# Patient Record
Sex: FEMALE | Race: WHITE | URBAN - METROPOLITAN AREA
[De-identification: names, ages, dates, MRNs, and addresses within clinical notes are randomized per-mention and may not be internally consistent; named-entity substitution may affect disease eponyms.]

---

## 2017-05-11 ENCOUNTER — TELEPHONE (OUTPATIENT)
Dept: NEUROLOGY | Age: 52
End: 2017-05-11

## 2017-05-11 RX ORDER — OXCARBAZEPINE 150 MG/1
TABLET, FILM COATED ORAL
Qty: 180 TAB | Refills: 1 | Status: SHIPPED | OUTPATIENT
Start: 2017-05-11 | End: 2017-07-18 | Stop reason: SDUPTHER

## 2017-05-11 RX ORDER — GABAPENTIN 300 MG/1
CAPSULE ORAL
Qty: 180 CAP | Refills: 1 | Status: SHIPPED | OUTPATIENT
Start: 2017-05-11 | End: 2017-07-18 | Stop reason: SDUPTHER

## 2017-05-11 NOTE — TELEPHONE ENCOUNTER
----- Message from Nile Foil sent at 5/11/2017  9:37 AM EDT -----  Regarding: /Refill  Pt called to set an appt for medication refill. Pt has an scheduled appt on Tuesday July 18, 2017 at 8:40am. Pt stated she will run out of medication before appt time. Pt take the medication gabapentin and trileptal Rx. Pt use [facility] pharmacy phone number is [phone]. Pt best contact number is [phone] alternate is work number [phone].

## 2017-07-18 ENCOUNTER — OFFICE VISIT (OUTPATIENT)
Dept: NEUROLOGY | Age: 52
End: 2017-07-18

## 2017-07-18 VITALS
RESPIRATION RATE: 18 BRPM | TEMPERATURE: 98.5 F | BODY MASS INDEX: 20.8 KG/M2 | OXYGEN SATURATION: 96 % | WEIGHT: 117.4 LBS | DIASTOLIC BLOOD PRESSURE: 60 MMHG | HEART RATE: 68 BPM | HEIGHT: 63 IN | SYSTOLIC BLOOD PRESSURE: 100 MMHG

## 2017-07-18 DIAGNOSIS — G50.0 TRIGEMINAL NEURALGIA OF LEFT SIDE OF FACE: Primary | ICD-10-CM

## 2017-07-18 RX ORDER — OXCARBAZEPINE 150 MG/1
TABLET, FILM COATED ORAL
Qty: 180 TAB | Refills: 11 | Status: SHIPPED | OUTPATIENT
Start: 2017-07-18 | End: 2018-11-23 | Stop reason: DRUGHIGH

## 2017-07-18 RX ORDER — GABAPENTIN 300 MG/1
CAPSULE ORAL
Qty: 180 CAP | Refills: 11 | Status: SHIPPED | OUTPATIENT
Start: 2017-07-18 | End: 2018-12-12 | Stop reason: DRUGHIGH

## 2017-07-18 NOTE — PATIENT INSTRUCTIONS
[address] [facility]   Statement to Patients  April 1, 2014      In an effort to ensure the large volume of patient prescription refills is processed in the most efficient and expeditious manner, we are asking our patients to assist us by calling your Pharmacy for all prescription refills, this will include also your  Mail Order Pharmacy. The pharmacy will contact our office electronically to continue the refill process. Please do not wait until the last minute to call your pharmacy. We need at least 48 hours (2days) to fill prescriptions. We also encourage you to call your pharmacy before going to  your prescription to make sure it is ready. With regard to controlled substance prescription refill requests (narcotic refills) that need to be picked up at our office, we ask your cooperation by providing us with at least 72 hours (3days) notice that you will need a refill. We will not refill narcotic prescription refill requests after 4:00pm on any weekday, Monday through Thursday, or after 2:00pm on Fridays, or on the weekends. We encourage everyone to explore another way of getting your prescription refill request processed using [email or web address], our patient web portal through our electronic medical record system. [email or web address] is an efficient and effective way to communicate your medication request directly to the office and  downloadable as an tomasa on your smart phone . [email or web address] also features a review functionality that allows you to view your medication list as well as leave messages for your physician. Are you ready to get connected? If so please review the attatched instructions or speak to any of our staff to get you set up right away! Thank you so much for your cooperation. Should you have any questions please contact our Practice Administrator.     The Physicians and Staff,  [facility]              A Healthy Lifestyle: Care Instructions  Your Care Instructions  A healthy lifestyle can help you feel good, stay at a healthy weight, and have plenty of energy for both work and play. A healthy lifestyle is something you can share with your whole family. A healthy lifestyle also can lower your risk for serious health problems, such as high blood pressure, heart disease, and diabetes. You can follow a few steps listed below to improve your health and the health of your family. Follow-up care is a key part of your treatment and safety. Be sure to make and go to all appointments, and call your doctor if you are having problems. Its also a good idea to know your test results and keep a list of the medicines you take. How can you care for yourself at home? · Do not eat too much sugar, fat, or fast foods. You can still have dessert and treats now and then. The goal is moderation. · Start small to improve your eating habits. Pay attention to portion sizes, drink less juice and soda pop, and eat more fruits and vegetables. ¨ Eat a healthy amount of food. A 3-ounce serving of meat, for example, is about the size of a deck of cards. Fill the rest of your plate with vegetables and whole grains. ¨ Limit the amount of soda and sports drinks you have every day. Drink more water when you are thirsty. ¨ Eat at least 5 servings of fruits and vegetables every day. It may seem like a lot, but it is not hard to reach this goal. A serving or helping is 1 piece of fruit, 1 cup of vegetables, or 2 cups of leafy, raw vegetables. Have an apple or some carrot sticks as an afternoon snack instead of a candy bar. Try to have fruits and/or vegetables at every meal.  · Make exercise part of your daily routine. You may want to start with simple activities, such as walking, bicycling, or slow swimming. Try to be active 30 to 60 minutes every day. You do not need to do all 30 to 60 minutes all at once. For example, you can exercise 3 times a day for 10 or 20 minutes.  Moderate exercise is safe for most people, but it is always a good idea to talk to your doctor before starting an exercise program.  · Keep moving. Renata Velezer the lawn, work in the garden, or [employer]. Take the stairs instead of the elevator at work. · If you smoke, quit. People who smoke have an increased risk for heart attack, stroke, cancer, and other lung illnesses. Quitting is hard, but there are ways to boost your chance of quitting tobacco for good. ¨ Use nicotine gum, patches, or lozenges. ¨ Ask your doctor about stop-smoking programs and medicines. ¨ Keep trying. In addition to reducing your risk of diseases in the future, you will notice some benefits soon after you stop using tobacco. If you have shortness of breath or asthma symptoms, they will likely get better within a few weeks after you quit. · Limit how much alcohol you drink. Moderate amounts of alcohol (up to 2 drinks a day for men, 1 drink a day for women) are okay. But drinking too much can lead to liver problems, high blood pressure, and other health problems. Family health  If you have a family, there are many things you can do together to improve your health. · Eat meals together as a family as often as possible. · Eat healthy foods. This includes fruits, vegetables, lean meats and dairy, and whole grains. · Include your family in your fitness plan. Most people think of activities such as jogging or tennis as the way to fitness, but there are many ways you and your family can be more active. Anything that makes you breathe hard and gets your heart pumping is exercise. Here are some tips:  ¨ Walk to do errands or to take your child to school or the bus. ¨ Go for a family bike ride after dinner instead of watching TV. Where can you learn more? Go to http://stuart-sae.info/. Enter X544 in the search box to learn more about \"A Healthy Lifestyle: Care Instructions. \"  Current as of: July 26, 2016  Content Version: 11.3  © 4401-4002 Healthwise, Incorporated. Care instructions adapted under license by [employer] (which disclaims liability or warranty for this information). If you have questions about a medical condition or this instruction, always ask your healthcare professional. [address] any warranty or liability for your use of this information. Patient doing well with pain control on current Trileptal gabapentin regimen. Check updated labs and revisit in 1 year.

## 2017-07-18 NOTE — PROGRESS NOTES
Neurology Consult      Subjective:      Tracy Pinto is a 45 y.o. female who returns with long-standing left trigeminal neuralgia form pain. Has been routinely happy with pain control Trileptal 150 mg taking to 3 times a day and gabapentin 300 mg taking 2 3 times a day. Sometimes alters her schedule to fit her pain frequency and this intake can change according to her needs. Does not mention any new medical or surgical issues. Last week had some brief pain breakdown but blamed it on her new job or change of medication schedule. Today's exam looks normal.  Had not been seen since May 2015. Needs updated labs. Feels comfortable with a revisit in 1 year as she has been followed since the 1990s as I recall from my old practice. If there is any change or difficulties in her situation she would need to call, and if I do not hear from her I will assume she is doing well. Current Outpatient Prescriptions   Medication Sig Dispense Refill    OXcarbazepine (TRILEPTAL) 150 mg tablet TAKE 2 TABLETS 3 TIMES A  Tab 11    gabapentin (NEURONTIN) 300 mg capsule TAKE 1 CAPSULE BY MOUTH SIX (6) TIMES DAILY. 180 Cap 11      Allergies   Allergen Reactions    Penicillins Hives     Past Medical History:   Diagnosis Date    Trigeminal neuralgia 11/28/2012      No past surgical history on file. Social History     Social History    Marital status:      Spouse name: N/A    Number of children: N/A    Years of education: N/A     Occupational History    Not on file.      Social History Main Topics    Smoking status: Never Smoker    Smokeless tobacco: Not on file    Alcohol use 2.0 oz/week     4 Cans of beer per week    Drug use: No    Sexual activity: Not on file     Other Topics Concern    Not on file     Social History Narrative      Family History   Problem Relation Age of Onset    Stroke Mother     Cancer Sister       Visit Vitals    /60    Pulse 68    Temp 98.5 °F (36.9 °C) (Oral)  Resp 18    Ht 5' 3\" (1.6 m)    Wt 53.3 kg (117 lb 6.4 oz)    SpO2 96%    BMI 20.8 kg/m2        Review of Systems:   A comprehensive review of systems was negative except for that written in the HPI. Neuro Exam:     Appearance: The patient is well developed, well nourished, provides a coherent history and is in no acute distress. Mental Status: Oriented to time, place and person. Mood and affect appropriate. Cranial Nerves:   Intact visual fields. Fundi are benign. BELLE, EOM's full, no nystagmus, no ptosis. Facial sensation is normal. Corneal reflexes are intact. Facial movement is symmetric. Hearing is normal bilaterally. Palate is midline with normal sternocleidomastoid and trapezius muscles are normal. Tongue is midline. Motor:  5/5 strength in upper and lower proximal and distal muscles. Normal bulk and tone. No fasciculations. Reflexes:   Deep tendon reflexes 2+/4 and symmetrical.   Sensory:   Normal to touch, pinprick and vibration. Gait:  Normal gait. Tremor:   No tremor noted. Cerebellar:  No cerebellar signs present. Neurovascular:  Normal heart sounds and regular rhythm, peripheral pulses intact, and no carotid bruits. Assessment:   Chronic left trigeminal neuralgic pain. Happy with pain control and will continue the Trileptal 150 mg taking 2 3 times a day and gabapentin 300 mg taking 2 3 times a day. No new issues uncovered exam is normal.  Check updated labs and revisit one year. Plan:   Revisit one year.   Signed by :  Chen Britton MD

## 2017-07-18 NOTE — MR AVS SNAPSHOT
Visit Information Date & Time Provider Department Dept. Phone Encounter #  
 7/18/2017  8:40 AM Bertha Shanks MD [facility] [phone] [id number] Follow-up Instructions Return in about 1 year (around 7/18/2018). Upcoming Health Maintenance Date Due Hepatitis C Screening 1965 DTaP/Tdap/Td series (1 - Tdap) 6/7/1984 PAP AKA CERVICAL CYTOLOGY 6/7/1984 BREAST CANCER SCRN MAMMOGRAM 6/7/2013 FOBT Q 1 YEAR AGE 50-75 6/7/2013 INFLUENZA AGE 9 TO ADULT 8/1/2017 Allergies as of 7/18/2017  Review Complete On: 7/18/2017 By: Bertha Shanks MD  
  
 Severity Noted Reaction Type Reactions Penicillins  11/28/2012    Hives Current Immunizations  Never Reviewed No immunizations on file. Not reviewed this visit You Were Diagnosed With   
  
 Codes Comments Trigeminal neuralgia of left side of face    -  Primary ICD-10-CM: G50.0 ICD-9-CM: 350.1 Vitals BP Pulse Temp Resp Height(growth percentile) Weight(growth percentile) 100/60 68 98.5 °F (36.9 °C) (Oral) 18 5' 3\" (1.6 m) 117 lb 6.4 oz (53.3 kg) SpO2 BMI OB Status Smoking Status 96% 20.8 kg/m2 Postmenopausal Never Smoker Vitals History BMI and BSA Data Body Mass Index Body Surface Area  
 20.8 kg/m 2 1.54 m 2 Preferred Pharmacy Pharmacy Name Phone [facility]/PHARMACY #[id number] - [place], [place] - [address] [phone] Your Updated Medication List  
  
   
This list is accurate as of: 7/18/17  9:03 AM.  Always use your most recent med list.  
  
  
  
  
 gabapentin 300 mg capsule Commonly known as:  NEURONTIN  
TAKE 1 CAPSULE BY MOUTH SIX (6) TIMES DAILY. OXcarbazepine 150 mg tablet Commonly known as:  TRILEPTAL  
TAKE 2 TABLETS 3 TIMES A DAY Prescriptions Sent to Pharmacy Refills  OXcarbazepine (TRILEPTAL) 150 mg tablet 11  
 Sig: TAKE 2 TABLETS 3 TIMES A DAY Class: Normal  
 Pharmacy: [facility]/pharmacy #[id number] [place], [place] - [address] Ph #: [phone]  
 gabapentin (NEURONTIN) 300 mg capsule 11 Sig: TAKE 1 CAPSULE BY MOUTH SIX (6) TIMES DAILY. Class: Normal  
 Pharmacy: [facility]/pharmacy #[id number] - [place], [place] - [address] Ph #: [phone] We Performed the Following CBC WITH AUTOMATED DIFF [68645 CPT(R)] HEPATIC FUNCTION PANEL [41297 CPT(R)] METABOLIC PANEL, BASIC [15755 CPT(R)] OXCARBAZEPINE(TRILEPTAL) [id number] CPT(R)] Follow-up Instructions Return in about 1 year (around 7/18/2018). Patient Instructions PRESCRIPTION REFILL POLICY [facility] Statement to Patients April 1, 2014 In an effort to ensure the large volume of patient prescription refills is processed in the most efficient and expeditious manner, we are asking our patients to assist us by calling your Pharmacy for all prescription refills, this will include also your  Mail Order Pharmacy. The pharmacy will contact our office electronically to continue the refill process. Please do not wait until the last minute to call your pharmacy. We need at least 48 hours (2days) to fill prescriptions. We also encourage you to call your pharmacy before going to  your prescription to make sure it is ready. With regard to controlled substance prescription refill requests (narcotic refills) that need to be picked up at our office, we ask your cooperation by providing us with at least 72 hours (3days) notice that you will need a refill. We will not refill narcotic prescription refill requests after 4:00pm on any weekday, Monday through Thursday, or after 2:00pm on Fridays, or on the weekends.   
  
We encourage everyone to explore another way of getting your prescription refill request processed using [employer], our patient web portal through our electronic medical record system. Nur[employer] is an efficient and effective way to communicate your medication request directly to the office and  downloadable as an tomasa on your smart phone . [employer] also features a review functionality that allows you to view your medication list as well as leave messages for your physician. Are you ready to get connected? If so please review the attatched instructions or speak to any of our staff to get you set up right away! Thank you so much for your cooperation. Should you have any questions please contact our Practice Administrator. The Physicians and Staff,  [facility] A Healthy Lifestyle: Care Instructions Your Care Instructions A healthy lifestyle can help you feel good, stay at a healthy weight, and have plenty of energy for both work and play. A healthy lifestyle is something you can share with your whole family. A healthy lifestyle also can lower your risk for serious health problems, such as high blood pressure, heart disease, and diabetes. You can follow a few steps listed below to improve your health and the health of your family. Follow-up care is a key part of your treatment and safety. Be sure to make and go to all appointments, and call your doctor if you are having problems. Its also a good idea to know your test results and keep a list of the medicines you take. How can you care for yourself at home? · Do not eat too much sugar, fat, or fast foods. You can still have dessert and treats now and then. The goal is moderation. · Start small to improve your eating habits. Pay attention to portion sizes, drink less juice and soda pop, and eat more fruits and vegetables. ¨ Eat a healthy amount of food. A 3-ounce serving of meat, for example, is about the size of a deck of cards. Fill the rest of your plate with vegetables and whole grains. ¨ Limit the amount of soda and sports drinks you have every day.  Drink more water when you are thirsty. ¨ Eat at least 5 servings of fruits and vegetables every day. It may seem like a lot, but it is not hard to reach this goal. A serving or helping is 1 piece of fruit, 1 cup of vegetables, or 2 cups of leafy, raw vegetables. Have an apple or some carrot sticks as an afternoon snack instead of a candy bar. Try to have fruits and/or vegetables at every meal. 
· Make exercise part of your daily routine. You may want to start with simple activities, such as walking, bicycling, or slow swimming. Try to be active 30 to 60 minutes every day. You do not need to do all 30 to 60 minutes all at once. For example, you can exercise 3 times a day for 10 or 20 minutes. Moderate exercise is safe for most people, but it is always a good idea to talk to your doctor before starting an exercise program. 
· Keep moving. Janet Heriquincy the lawn, work in the garden, or [employer]. Take the stairs instead of the elevator at work. · If you smoke, quit. People who smoke have an increased risk for heart attack, stroke, cancer, and other lung illnesses. Quitting is hard, but there are ways to boost your chance of quitting tobacco for good. ¨ Use nicotine gum, patches, or lozenges. ¨ Ask your doctor about stop-smoking programs and medicines. ¨ Keep trying. In addition to reducing your risk of diseases in the future, you will notice some benefits soon after you stop using tobacco. If you have shortness of breath or asthma symptoms, they will likely get better within a few weeks after you quit. · Limit how much alcohol you drink. Moderate amounts of alcohol (up to 2 drinks a day for men, 1 drink a day for women) are okay. But drinking too much can lead to liver problems, high blood pressure, and other health problems. Family health If you have a family, there are many things you can do together to improve your health. · Eat meals together as a family as often as possible. · Eat healthy foods. This includes fruits, vegetables, lean meats and dairy, and whole grains. · Include your family in your fitness plan. Most people think of activities such as jogging or tennis as the way to fitness, but there are many ways you and your family can be more active. Anything that makes you breathe hard and gets your heart pumping is exercise. Here are some tips: 
¨ Walk to do errands or to take your child to school or the bus. ¨ Go for a family bike ride after dinner instead of watching TV. Where can you learn more? Go to http://stuartGigitsae.info/. Enter B780 in the search box to learn more about \"A Healthy Lifestyle: Care Instructions. \" Current as of: July 26, 2016 Content Version: 11.3 © 7325-0195 [employer]. Care instructions adapted under license by [employer] (which disclaims liability or warranty for this information). If you have questions about a medical condition or this instruction, always ask your healthcare professional. [place] [address] any warranty or liability for your use of this information. Patient doing well with pain control on current Trileptal gabapentin regimen. Check updated labs and revisit in 1 year. Introducing [facility]! Lima Salcido introduces [employer] patient portal. Now you can access parts of your medical record, email your doctor's office, and request medication refills online. 1. In your internet browser, go to [email or web address] 2. Click on the First Time User? Click Here link in the Sign In box. You will see the New Member Sign Up page. 3. Enter your [employer] Access Code exactly as it appears below. You will not need to use this code after youve completed the sign-up process. If you do not sign up before the expiration date, you must request a new code. · [employer] Access Code: [email or web address] Expires: 10/16/2017  8:26 AM 
 
 4. Enter the last four digits of your Social Security Number (xxxx) and Date of Birth (mm/dd/yyyy) as indicated and click Submit. You will be taken to the next sign-up page. 5. Create a Viewster ID. This will be your Viewster login ID and cannot be changed, so think of one that is secure and easy to remember. 6. Create a Viewster password. You can change your password at any time. 7. Enter your Password Reset Question and Answer. This can be used at a later time if you forget your password. 8. Enter your e-mail address. You will receive e-mail notification when new information is available in [address] 9. Click Sign Up. You can now view and download portions of your medical record. 10. Click the Download Summary menu link to download a portable copy of your medical information. If you have questions, please visit the Frequently Asked Questions section of the View[employer] website. Remember, View[employer] is NOT to be used for urgent needs. For medical emergencies, dial 911. Now available from your iPhone and Android! Please provide this summary of care documentation to your next provider. Your primary care clinician is listed as Edwin Goshen. If you have any questions after today's visit, please call [phone].

## 2018-02-06 ENCOUNTER — OFFICE VISIT (OUTPATIENT)
Dept: NEUROLOGY | Age: 53
End: 2018-02-06

## 2018-02-06 VITALS
TEMPERATURE: 98.2 F | HEART RATE: 86 BPM | RESPIRATION RATE: 18 BRPM | DIASTOLIC BLOOD PRESSURE: 60 MMHG | HEIGHT: 63 IN | OXYGEN SATURATION: 98 % | WEIGHT: 121.9 LBS | SYSTOLIC BLOOD PRESSURE: 112 MMHG | BODY MASS INDEX: 21.6 KG/M2

## 2018-02-06 DIAGNOSIS — F41.9 ANXIETY: ICD-10-CM

## 2018-02-06 DIAGNOSIS — G50.0 TRIGEMINAL NEURALGIA OF LEFT SIDE OF FACE: Primary | ICD-10-CM

## 2018-02-06 RX ORDER — LORAZEPAM 0.5 MG/1
TABLET ORAL
Qty: 12 TAB | Refills: 0 | Status: SHIPPED | OUTPATIENT
Start: 2018-02-06 | End: 2020-03-25 | Stop reason: SDUPTHER

## 2018-02-06 NOTE — PROGRESS NOTES
Neurology Consult      Subjective:      Julien Roca is a 45 y.o. female who comes in with long-established left V2 distribution trigeminal neuralgia. I believe her normal control schedule is Trileptal 150 mg taking the equivalent of 2 3 times a day or 6 total in some fashion. On the gabapentin it is 300 mg and again up to 6 total per day on average. For some reason the pain resurfaced while she was driving her car. Was very intense and she became very anxious because she feared the pain would escalate and would be uncontrollable. By degrees started to add the equivalent of an extra Trileptal and gabapentin to her daily regimen. The difficulty was she started getting dizziness in recent days and that caused her to be anxious as well. Is very uptight and stressed and this probably goes along with steady-state stress on the job where she is assigned a new job location which is not convenient or conducive to her residence. Interestingly, she is noticed no sensory changes in her mouth or tooth sensitivity etc. no recent history of trauma rash chills fever or similar sort of triggers. On today's exam she is completely normal and recently described the pain characteristic as electrical and was able to find by discovery the touching certain parts of her face that she has the nose was a prime instigator. Current Outpatient Prescriptions   Medication Sig Dispense Refill    LORazepam (ATIVAN) 0.5 mg tablet 1/2 po every day prn only. .. Indications: anxiety 12 Tab 0    OXcarbazepine (TRILEPTAL) 150 mg tablet TAKE 2 TABLETS 3 TIMES A  Tab 11    gabapentin (NEURONTIN) 300 mg capsule TAKE 1 CAPSULE BY MOUTH SIX (6) TIMES DAILY. 180 Cap 11      Allergies   Allergen Reactions    Penicillins Hives     Past Medical History:   Diagnosis Date    Trigeminal neuralgia 11/28/2012      No past surgical history on file.    Social History     Social History    Marital status:      Spouse name: N/A  Number of children: N/A    Years of education: N/A     Occupational History    Not on file. Social History Main Topics    Smoking status: Never Smoker    Smokeless tobacco: Never Used    Alcohol use 2.0 oz/week     4 Cans of beer per week    Drug use: No    Sexual activity: Not on file     Other Topics Concern    Not on file     Social History Narrative      Family History   Problem Relation Age of Onset    Stroke Mother     Cancer Sister       Visit Vitals    /60    Pulse 86    Temp 98.2 °F (36.8 °C) (Oral)    Resp 18    Ht 5' 3\" (1.6 m)    Wt 55.3 kg (121 lb 14.4 oz)    SpO2 98%    BMI 21.59 kg/m2        Review of Systems:   A comprehensive review of systems was negative except for that written in the HPI. Neuro Exam:     Appearance: The patient is well developed, well nourished, provides a coherent history and is in no acute distress. Mental Status: Oriented to time, place and person. Mood and affect appropriate. Cranial Nerves:   Intact visual fields. Fundi are benign. BELLE, EOM's full, no nystagmus, no ptosis. Facial sensation is normal. Corneal reflexes are intact. Facial movement is symmetric. Hearing is normal bilaterally. Palate is midline with normal sternocleidomastoid and trapezius muscles are normal. Tongue is midline. Motor:  5/5 strength in upper and lower proximal and distal muscles. Normal bulk and tone. No fasciculations. Reflexes:   Deep tendon reflexes 2+/4 and symmetrical.   Sensory:   Normal to touch, pinprick and vibration. Gait:  Normal gait. Tremor:   No tremor noted. Cerebellar:  No cerebellar signs present. Neurovascular:  Normal heart sounds and regular rhythm, peripheral pulses intact, and no carotid bruits. Assessment:   Left V2 trigeminal neuralgia. Recent exacerbation and has increased her intake of the gabapentin and Trileptal accordingly.   Will need some positive identification to what her baseline dosing was in which she recently increased to. She will call us back tomorrow to confirm that. Says this whole experience is made her more anxious than usual and issued her 0.5 mg strength Ativan half a tablet a day as needed as needed. Was warned about sedation especially in lieu of the medicine she is already on with the gabapentin and Trileptal.  Revisit in 6 months. For future reference could consider a referral to neurosurgery which she has done I believe once before at my old practice. That was an encounter and did not lead to gamma knife surgery or anything similar. For what it is worth today's exam was normal.       Plan:   Revisit 6 months.   Signed by :  Kaylee Caal MD

## 2018-02-06 NOTE — PATIENT INSTRUCTIONS
[facility]   Statement to Patients  April 1, 2014      In an effort to ensure the large volume of patient prescription refills is processed in the most efficient and expeditious manner, we are asking our patients to assist us by calling your Pharmacy for all prescription refills, this will include also your  Mail Order Pharmacy. The pharmacy will contact our office electronically to continue the refill process. Please do not wait until the last minute to call your pharmacy. We need at least 48 hours (2days) to fill prescriptions. We also encourage you to call your pharmacy before going to  your prescription to make sure it is ready. With regard to controlled substance prescription refill requests (narcotic refills) that need to be picked up at our office, we ask your cooperation by providing us with at least 72 hours (3days) notice that you will need a refill. We will not refill narcotic prescription refill requests after 4:00pm on any weekday, Monday through Thursday, or after 2:00pm on Fridays, or on the weekends. We encourage everyone to explore another way of getting your prescription refill request processed using Viyet, our patient web portal through our electronic medical record system. Viyet is an efficient and effective way to communicate your medication request directly to the office and  downloadable as an tomasa on your smart phone . Viyet also features a review functionality that allows you to view your medication list as well as leave messages for your physician. Are you ready to get connected? If so please review the attatched instructions or speak to any of our staff to get you set up right away! Thank you so much for your cooperation. Should you have any questions please contact our Practice Administrator.     The Physicians and Staff,  [facility]              Trigeminal Neuralgia: Care Instructions  Your Care Instructions    Trigeminal neuralgia is a problem with the large nerve that brings feeling to your face. It causes a sudden, sharp pain on one side of your face. Just touching your cheek or talking can set off shooting pain toward the ear, eye, or nostril. Living with this pain can be very hard. Some people have long periods when they do not have pain, and then it comes back. Some people have periods of pain often. But medicine or other treatment often can make the pain go away. If you keep having pain, surgery may help. This problem is also called tic douloureux (say \"tik doo-chle-HARDY\"). Follow-up care is a key part of your treatment and safety. Be sure to make and go to all appointments, and call your doctor if you are having problems. It's also a good idea to know your test results and keep a list of the medicines you take. How can you care for yourself at home? · Write down when you have pain and what you were doing when it started. Try to find what causes the pain. Being in a cold wind, yawning, or shaving are examples. Avoid or limit these triggers if you can. · Be safe with medicines. Take your medicines exactly as prescribed. ¨ Your doctor may have prescribed medicines used to treat depression and seizures. They can reduce your pain, help you sleep better, and improve your mood. ¨ Call your doctor if you think you are having a problem with your medicine. You will get more details on the specific medicines your doctor prescribes. ¨ If you are not taking a prescription pain medicine, take an over-the-counter medicine such as acetaminophen (Tylenol), ibuprofen (Advil, Motrin), or naproxen (Aleve). Read and follow all instructions on the label. ¨ Do not take two or more pain medicines at the same time unless the doctor told you to. Many pain medicines have acetaminophen, which is Tylenol. Too much acetaminophen (Tylenol) can be harmful. · Reduce stress in your life.  Ask your doctor about ways to relax. These may include breathing exercises and massage. · Think about joining a support group with other people who have this problem. These groups can give comfort and information about what to do to feel better. Your doctor can tell you how to find a support group. When should you call for help? Call your doctor now or seek immediate medical care if:  ? · You have severe pain that you can't control. ? Watch closely for changes in your health, and be sure to contact your doctor if:  ? · You are not able to sleep because of the pain. ? · You do not get better as expected. Where can you learn more? Go to http://stuart-sae.info/. Enter R378 in the search box to learn more about \"Trigeminal Neuralgia: Care Instructions. \"  Current as of: March 20, 2017  Content Version: 11.4  © 4549-1086 [employer]. Care instructions adapted under license by [employer] (which disclaims liability or warranty for this information). If you have questions about a medical condition or this instruction, always ask your healthcare professional. [place] [address] any warranty or liability for your use of this information. Learning About Living Jason Neither  What is a living will? A living will is a legal form you use to write down the kind of care you want at the end of your life. It is used by the health professionals who will treat you if you aren't able to decide for yourself. If you put your wishes in writing, your loved ones and others will know what kind of care you want. They won't need to guess. This can ease your mind and be helpful to others. A living will is not the same as an estate or property will. An estate will explains what you want to happen with your money and property after you die. Is a living will a legal document? A living will is a legal document. Each state has its own laws about living [place].  If you move to another state, make sure that your living will is legal in the state where you now live. Or you might use a universal form that has been approved by many states. This kind of form can sometimes be completed and stored online. Your electronic copy will then be available wherever you have a connection to the Internet. In most cases, doctors will respect your wishes even if you have a form from a different state. · You don't need an  to complete a living will. But legal advice can be helpful if your state's laws are unclear, your health history is complicated, or your family can't agree on what should be in your living will. · You can change your living will at any time. Some people find that their wishes about end-of-life care change as their health changes. · In addition to making a living will, think about completing a medical power of  form. This form lets you name the person you want to make end-of-life treatment decisions for you (your \"health care agent\") if you're not able to. Many hospitals and nursing homes will give you the forms you need to complete a living will and a medical power of . · Your living will is used only if you can't make or communicate decisions for yourself anymore. If you become able to make decisions again, you can accept or refuse any treatment, no matter what you wrote in your living will. · Your state may offer an online registry. This is a place where you can store your living will online so the doctors and nurses who need to treat you can find it right away. What should you think about when creating a living will? Talk about your end-of-life wishes with your family members and your doctor. Let them know what you want. That way the people making decisions for you won't be surprised by your choices. Think about these questions as you make your living will:  · Do you know enough about life support methods that might be used?  If not, talk to your doctor so you know what might be done if you can't breathe on your own, your heart stops, or you're unable to swallow. · What things would you still want to be able to do after you receive life-support methods? Would you want to be able to walk? To speak? To eat on your own? To live without the help of machines? · If you have a choice, where do you want to be cared for? In your home? At a hospital or nursing home? · Do you want certain Spiritism practices performed if you become very ill? · If you have a choice at the end of your life, where would you prefer to die? At home? In a hospital or nursing home? Somewhere else? · Would you prefer to be buried or cremated? · Do you want your organs to be donated after you die? What should you do with your living will? · Make sure that your family members and your health care agent have copies of your living will. · Give your doctor a copy of your living will to keep in your medical record. If you have more than one doctor, make sure that each one has a copy. · You may want to put a copy of your living will where it can be easily found. Where can you learn more? Go to http://stuart-sae.info/. Enter T463 in the search box to learn more about \"Learning About Living Rob Thomason. \"  Current as of: September 24, 2016  Content Version: 11.4  © 4929-0083 [employer]. Care instructions adapted under license by [employer] (which disclaims liability or warranty for this information). If you have questions about a medical condition or this instruction, always ask your healthcare professional. [place] [address] any warranty or liability for your use of this information. Patient history reviewed and patient examined. Sounds like a recent relapse in the trigeminal neuralgia and will increase both the Trileptal and gabapentin to 7 each per day. On request will issue some very low-dose Ativan with the anxiety provoking features with the pain.   Revisit here in 6 months.

## 2018-02-06 NOTE — MR AVS SNAPSHOT
Wilmerinez Camacho 
 
 
 [place] [address] [id number] [phone] Patient: Alvaro Cuadra MRN: [id number] Visit Information Date & Time Provider Department Dept. Phone Encounter #  
 2/6/2018  3:00 PM Manish Borjas MD 3 [facility]rology [facility] [phone] [id number] Follow-up Instructions Return in about 6 months (around 8/6/2018). Your Appointments 7/24/2018  8:40 AM  
Follow Up with Manish Borjas MD  
[facility] Appt Note: neuralgia ericka  
 [address] [address] [phone]  
  
   
 [place] [address] [id number] [address] Upcoming Health Maintenance Date Due Hepatitis C Screening 1965 DTaP/Tdap/Td series (1 - Tdap) 6/7/1984 PAP AKA CERVICAL CYTOLOGY 6/7/1984 BREAST CANCER SCRN MAMMOGRAM 6/7/2013 FOBT Q 1 YEAR AGE 50-75 6/7/2013 Influenza Age 5 to Adult 8/1/2017 Allergies as of 2/6/2018  Review Complete On: 2/6/2018 By: Manish Borjas MD  
  
 Severity Noted Reaction Type Reactions Penicillins  11/28/2012    Hives Current Immunizations  Never Reviewed No immunizations on file. Not reviewed this visit You Were Diagnosed With   
  
 Codes Comments Trigeminal neuralgia of left side of face    -  Primary ICD-10-CM: G50.0 ICD-9-CM: 350.1 Anxiety     ICD-10-CM: F41.9 ICD-9-CM: 300.00 Vitals BP Pulse Temp Resp Height(growth percentile) Weight(growth percentile) 112/60 86 98.2 °F (36.8 °C) (Oral) 18 5' 3\" (1.6 m) 121 lb 14.4 oz (55.3 kg) SpO2 BMI OB Status Smoking Status 98% 21.59 kg/m2 Postmenopausal Never Smoker Vitals History BMI and BSA Data Body Mass Index Body Surface Area  
 21.59 kg/m 2 1.57 m 2 Preferred Pharmacy Pharmacy Name Phone [id number] - [place], [place] - [address] [address] [phone] Your Updated Medication List  
  
   
This list is accurate as of: 18  3:49 PM.  Always use your most recent med list.  
  
  
  
  
 gabapentin 300 mg capsule Commonly known as:  NEURONTIN  
TAKE 1 CAPSULE BY MOUTH SIX (6) TIMES DAILY. LORazepam 0.5 mg tablet Commonly known as:  ATIVAN  
1/2 po every day prn only. .. Indications: anxiety OXcarbazepine 150 mg tablet Commonly known as:  TRILEPTAL  
TAKE 2 TABLETS 3 TIMES A DAY Prescriptions Printed Refills LORazepam (ATIVAN) 0.5 mg tablet 0 Si/2 po every day prn only. .. Indications: anxiety Class: Print Follow-up Instructions Return in about 6 months (around 2018). Patient Instructions PRESCRIPTION REFILL POLICY [facility] Statement to Patients 2014 In an effort to ensure the large volume of patient prescription refills is processed in the most efficient and expeditious manner, we are asking our patients to assist us by calling your Pharmacy for all prescription refills, this will include also your  Mail Order Pharmacy. The pharmacy will contact our office electronically to continue the refill process. Please do not wait until the last minute to call your pharmacy. We need at least 48 hours (2days) to fill prescriptions. We also encourage you to call your pharmacy before going to  your prescription to make sure it is ready. With regard to controlled substance prescription refill requests (narcotic refills) that need to be picked up at our office, we ask your cooperation by providing us with at least 72 hours (3days) notice that you will need a refill. We will not refill narcotic prescription refill requests after 4:00pm on any weekday, Monday through Thursday, or after 2:00pm on , or on the weekends. We encourage everyone to explore another way of getting your prescription refill request processed using [employer], our patient web portal through our electronic medical record system. [employer] is an efficient and effective way to communicate your medication request directly to the office and  downloadable as an tomasa on your smart phone . [employer] also features a review functionality that allows you to view your medication list as well as leave messages for your physician. Are you ready to get connected? If so please review the attatched instructions or speak to any of our staff to get you set up right away! Thank you so much for your cooperation. Should you have any questions please contact our Practice Administrator. The Physicians and Staff,  [facility] Trigeminal Neuralgia: Care Instructions Your Care Instructions Trigeminal neuralgia is a problem with the large nerve that brings feeling to your face. It causes a sudden, sharp pain on one side of your face. Just touching your cheek or talking can set off shooting pain toward the ear, eye, or nostril. Living with this pain can be very hard. Some people have long periods when they do not have pain, and then it comes back. Some people have periods of pain often. But medicine or other treatment often can make the pain go away. If you keep having pain, surgery may help. This problem is also called tic douloureux (say \"chrissiek doo-chel-HARDY\"). Follow-up care is a key part of your treatment and safety. Be sure to make and go to all appointments, and call your doctor if you are having problems. It's also a good idea to know your test results and keep a list of the medicines you take. How can you care for yourself at home? · Write down when you have pain and what you were doing when it started. Try to find what causes the pain. Being in a cold wind, yawning, or shaving are examples. Avoid or limit these triggers if you can. · Be safe with medicines. Take your medicines exactly as prescribed. ¨ Your doctor may have prescribed medicines used to treat depression and seizures. They can reduce your pain, help you sleep better, and improve your mood. ¨ Call your doctor if you think you are having a problem with your medicine. You will get more details on the specific medicines your doctor prescribes. ¨ If you are not taking a prescription pain medicine, take an over-the-counter medicine such as acetaminophen (Tylenol), ibuprofen (Advil, Motrin), or naproxen (Aleve). Read and follow all instructions on the label. ¨ Do not take two or more pain medicines at the same time unless the doctor told you to. Many pain medicines have acetaminophen, which is Tylenol. Too much acetaminophen (Tylenol) can be harmful. · Reduce stress in your life. Ask your doctor about ways to relax. These may include breathing exercises and massage. · Think about joining a support group with other people who have this problem. These groups can give comfort and information about what to do to feel better. Your doctor can tell you how to find a support group. When should you call for help? Call your doctor now or seek immediate medical care if: 
? · You have severe pain that you can't control. ? Watch closely for changes in your health, and be sure to contact your doctor if: 
? · You are not able to sleep because of the pain. ? · You do not get better as expected. Where can you learn more? Go to http://stuart-sae.info/. Enter R378 in the search box to learn more about \"Trigeminal Neuralgia: Care Instructions. \" Current as of: March 20, 2017 Content Version: 11.4 © 4420-2076 Kijubi. Care instructions adapted under license by [employer] (which disclaims liability or warranty for this information).  If you have questions about a medical condition or this instruction, always ask your healthcare professional. [place] [address] any warranty or liability for your use of this information. [address] What is a living will? A living will is a legal form you use to write down the kind of care you want at the end of your life. It is used by the health professionals who will treat you if you aren't able to decide for yourself. If you put your wishes in writing, your loved ones and others will know what kind of care you want. They won't need to guess. This can ease your mind and be helpful to others. A living will is not the same as an estate or property will. An estate will explains what you want to happen with your money and property after you die. Is a living will a legal document? A living will is a legal document. Each state has its own laws about living parkinson. If you move to another state, make sure that your living will is legal in the state where you now live. Or you might use a universal form that has been approved by many states. This kind of form can sometimes be completed and stored online. Your electronic copy will then be available wherever you have a connection to the Internet. In most cases, doctors will respect your wishes even if you have a form from a different state. · You don't need an  to complete a living will. But legal advice can be helpful if your state's laws are unclear, your health history is complicated, or your family can't agree on what should be in your living will. · You can change your living will at any time. Some people find that their wishes about end-of-life care change as their health changes. · In addition to making a living will, think about completing a medical power of  form.  This form lets you name the person you want to make end-of-life treatment decisions for you (your \"health care agent\") if you're not able to. Many hospitals and nursing homes will give you the forms you need to complete a living will and a medical power of . · Your living will is used only if you can't make or communicate decisions for yourself anymore. If you become able to make decisions again, you can accept or refuse any treatment, no matter what you wrote in your living will. · Your state may offer an online registry. This is a place where you can store your living will online so the doctors and nurses who need to treat you can find it right away. What should you think about when creating a living will? Talk about your end-of-life wishes with your family members and your doctor. Let them know what you want. That way the people making decisions for you won't be surprised by your choices. Think about these questions as you make your living will: · Do you know enough about life support methods that might be used? If not, talk to your doctor so you know what might be done if you can't breathe on your own, your heart stops, or you're unable to swallow. · What things would you still want to be able to do after you receive life-support methods? Would you want to be able to walk? To speak? To eat on your own? To live without the help of machines? · If you have a choice, where do you want to be cared for? In your home? At a hospital or nursing home? · Do you want certain Anabaptism practices performed if you become very ill? · If you have a choice at the end of your life, where would you prefer to die? At home? In a hospital or nursing home? Somewhere else? · Would you prefer to be buried or cremated? · Do you want your organs to be donated after you die? What should you do with your living will? · Make sure that your family members and your health care agent have copies of your living will.  
· Give your doctor a copy of your living will to keep in your medical record. If you have more than one doctor, make sure that each one has a copy. · You may want to put a copy of your living will where it can be easily found. Where can you learn more? Go to http://stuart-sae.info/. Enter W025 in the search box to learn more about \"Learning About Living Lisbet. \" Current as of: September 24, 2016 Content Version: 11.4 © 3146-7254 CashYou. Care instructions adapted under license by [employer] (which disclaims liability or warranty for this information). If you have questions about a medical condition or this instruction, always ask your healthcare professional. [address] any warranty or liability for your use of this information. Patient history reviewed and patient examined. Sounds like a recent relapse in the trigeminal neuralgia and will increase both the Trileptal and gabapentin to 7 each per day. On request will issue some very low-dose Ativan with the anxiety provoking features with the pain. Revisit here in 6 months. Introducing [facility]! Bennett Delgadillo introduces [employer] patient portal. Now you can access parts of your medical record, email your doctor's office, and request medication refills online. 1. In your internet browser, go to [email or web address] 2. Click on the First Time User? Click Here link in the Sign In box. You will see the New Member Sign Up page. 3. Enter your [employer] Access Code exactly as it appears below. You will not need to use this code after youve completed the sign-up process. If you do not sign up before the expiration date, you must request a new code. · [employer] Access Code: [email or web address] Expires: 5/7/2018  3:49 PM 
 
4. Enter the last four digits of your Social Security Number (xxxx) and Date of Birth (mm/dd/yyyy) as indicated and click Submit. You will be taken to the next sign-up page. 5. Create a [employer] ID. This will be your [employer] login ID and cannot be changed, so think of one that is secure and easy to remember. 6. Create a [employer] password. You can change your password at any time. 7. Enter your Password Reset Question and Answer. This can be used at a later time if you forget your password. 8. Enter your e-mail address. You will receive e-mail notification when new information is available in [address] 9. Click Sign Up. You can now view and download portions of your medical record. 10. Click the Download Summary menu link to download a portable copy of your medical information. If you have questions, please visit the Frequently Asked Questions section of the [employer] website. Remember, [employer] is NOT to be used for urgent needs. For medical emergencies, dial 911. Now available from your iPhone and Android! Please provide this summary of care documentation to your next provider. Your primary care clinician is listed as Patrice Dodd. If you have any questions after today's visit, please call [phone].

## 2018-02-09 ENCOUNTER — TELEPHONE (OUTPATIENT)
Dept: NEUROLOGY | Age: 53
End: 2018-02-09

## 2018-02-09 NOTE — TELEPHONE ENCOUNTER
----- Message from [facility] sent at 2/9/2018  8:31 AM EST -----  Regarding: /Telephone  Pt called requesting a call back from the nurse. Pt best contact number is [phone].

## 2018-02-09 NOTE — TELEPHONE ENCOUNTER
Patient calls to report accurate medication usage. Ordered: Trileptal 150 mg                    2 po TID  Takin+ tabs per day      Ordered: Gabapentin 300 mg                   6 caps/day  Takin+ caps per day    Dr. Greer Lowry made aware.

## 2018-02-10 RX ORDER — GABAPENTIN 300 MG/1
CAPSULE ORAL
Qty: 240 CAP | Refills: 6 | Status: SHIPPED | OUTPATIENT
Start: 2018-02-10 | End: 2018-06-04 | Stop reason: SDUPTHER

## 2018-02-10 RX ORDER — OXCARBAZEPINE 150 MG/1
TABLET, FILM COATED ORAL
Qty: 240 TAB | Refills: 6 | Status: SHIPPED | OUTPATIENT
Start: 2018-02-10 | End: 2018-09-29 | Stop reason: SDUPTHER

## 2018-11-19 ENCOUNTER — TELEPHONE (OUTPATIENT)
Dept: NEUROLOGY | Age: 53
End: 2018-11-19

## 2018-11-20 RX ORDER — GABAPENTIN 300 MG/1
CAPSULE ORAL
Qty: 180 CAP | Refills: 11 | Status: CANCELLED | OUTPATIENT
Start: 2018-11-20

## 2018-11-20 NOTE — TELEPHONE ENCOUNTER
----- Message from Adeyoh sent at 11/20/2018 12:12 PM EST -----  Regarding: Dr. Arnol Greene  Pt retuning a miss call form the nurse in regards to schedule an appt for medication refill \"Gabapentine\". Pt stated, this is fine however she is out of medication now and inquiring if a partial refill could be order until next appt. Requesting for medication to be sent to [facility] ([phone]. Best contact number ([phone] alternate number (G)[phone] ext: [phone]  Pt stated, she is only available on cell for another hour after this please call work number if no answer.

## 2018-11-21 RX ORDER — GABAPENTIN 300 MG/1
CAPSULE ORAL
Qty: 180 CAP | Refills: 3 | Status: SHIPPED | OUTPATIENT
Start: 2018-11-21 | End: 2018-11-23 | Stop reason: SDUPTHER

## 2018-11-21 NOTE — TELEPHONE ENCOUNTER
----- Message from Jaxon Colin sent at 11/21/2018 11:48 AM EST -----  Regarding: Dr Jay/rx refill  Pt's (p) [phone], or (w) [phone], extn [phone], pt said do not leave a message if she does not answer her cell, please then try her work number, she is following up on several messages left by her and her pharmacy for her pain medication Gabapentin, she is totally out of her medication since yesterday and is in a lot of pain.

## 2018-11-21 NOTE — TELEPHONE ENCOUNTER
Pt calling to check the status of refill  Pt has an appt on Friday @ 11:00 am   Pt is completely out of medication and has not taken for 2 days  Best # [phone]

## 2018-11-23 ENCOUNTER — OFFICE VISIT (OUTPATIENT)
Dept: NEUROLOGY | Age: 53
End: 2018-11-23

## 2018-11-23 VITALS
BODY MASS INDEX: 21.83 KG/M2 | OXYGEN SATURATION: 94 % | HEART RATE: 70 BPM | RESPIRATION RATE: 16 BRPM | DIASTOLIC BLOOD PRESSURE: 66 MMHG | SYSTOLIC BLOOD PRESSURE: 112 MMHG | WEIGHT: 123.2 LBS | HEIGHT: 63 IN

## 2018-11-23 DIAGNOSIS — G50.0 TRIGEMINAL NEURALGIA OF LEFT SIDE OF FACE: Primary | ICD-10-CM

## 2018-11-23 NOTE — PATIENT INSTRUCTIONS
[facility]   Statement to Patients  April 1, 2014      In an effort to ensure the large volume of patient prescription refills is processed in the most efficient and expeditious manner, we are asking our patients to assist us by calling your Pharmacy for all prescription refills, this will include also your  Mail Order Pharmacy. The pharmacy will contact our office electronically to continue the refill process. Please do not wait until the last minute to call your pharmacy. We need at least 48 hours (2days) to fill prescriptions. We also encourage you to call your pharmacy before going to  your prescription to make sure it is ready. With regard to controlled substance prescription refill requests (narcotic refills) that need to be picked up at our office, we ask your cooperation by providing us with at least 72 hours (3days) notice that you will need a refill. We will not refill narcotic prescription refill requests after 4:00pm on any weekday, Monday through Thursday, or after 2:00pm on Fridays, or on the weekends. We encourage everyone to explore another way of getting your prescription refill request processed using [email or web address], our patient web portal through our electronic medical record system. [email or web address] is an efficient and effective way to communicate your medication request directly to the office and  downloadable as an tomasa on your smart phone . [email or web address] also features a review functionality that allows you to view your medication list as well as leave messages for your physician. Are you ready to get connected? If so please review the attatched instructions or speak to any of our staff to get you set up right away! Thank you so much for your cooperation. Should you have any questions please contact our Practice Administrator.     The Physicians and Staff,  [facility]  What is a living will?    A living will is a legal form you use to write down the kind of care you want at the end of your life. It is used by the health professionals who will treat you if you aren't able to decide for yourself. If you put your wishes in writing, your loved ones and others will know what kind of care you want. They won't need to guess. This can ease your mind and be helpful to others. A living will is not the same as an estate or property will. An estate will explains what you want to happen with your money and property after you die. Is a living will a legal document? A living will is a legal document. Each state has its own laws about living parkinson. If you move to another state, make sure that your living will is legal in the state where you now live. Or you might use a universal form that has been approved by many states. This kind of form can sometimes be completed and stored online. Your electronic copy will then be available wherever you have a connection to the Internet. In most cases, doctors will respect your wishes even if you have a form from a different state. · You don't need an  to complete a living will. But legal advice can be helpful if your state's laws are unclear, your health history is complicated, or your family can't agree on what should be in your living will. · You can change your living will at any time. Some people find that their wishes about end-of-life care change as their health changes. · In addition to making a living will, think about completing a medical power of  form. This form lets you name the person you want to make end-of-life treatment decisions for you (your \"health care agent\") if you're not able to. Many hospitals and nursing homes will give you the forms you need to complete a living will and a medical power of . · Your living will is used only if you can't make or communicate decisions for yourself anymore.  If you become able to make decisions again, you can accept or refuse any treatment, no matter what you wrote in your living will. · Your state may offer an online registry. This is a place where you can store your living will online so the doctors and nurses who need to treat you can find it right away. What should you think about when creating a living will? Talk about your end-of-life wishes with your family members and your doctor. Let them know what you want. That way the people making decisions for you won't be surprised by your choices. Think about these questions as you make your living will:  · Do you know enough about life support methods that might be used? If not, talk to your doctor so you know what might be done if you can't breathe on your own, your heart stops, or you're unable to swallow. · What things would you still want to be able to do after you receive life-support methods? Would you want to be able to walk? To speak? To eat on your own? To live without the help of machines? · If you have a choice, where do you want to be cared for? In your home? At a hospital or nursing home? · Do you want certain Restoration practices performed if you become very ill? · If you have a choice at the end of your life, where would you prefer to die? At home? In a hospital or nursing home? Somewhere else? · Would you prefer to be buried or cremated? · Do you want your organs to be donated after you die? What should you do with your living will? · Make sure that your family members and your health care agent have copies of your living will. · Give your doctor a copy of your living will to keep in your medical record. If you have more than one doctor, make sure that each one has a copy. · You may want to put a copy of your living will where it can be easily found. Where can you learn more? Go to http://stuart-sae.info/. Enter S803 in the search box to learn more about \"Learning About Living Peranival. \"  Current as of: April 19, 2018  Content Version: 11.8  © 6493-5727 Healthwise, CoachMePlus. Care instructions adapted under license by [employer] (which disclaims liability or warranty for this information). If you have questions about a medical condition or this instruction, always ask your healthcare professional. [address] any warranty or liability for your use of this information. Patient history reviewed and patient examined. Patient will continue with the Trileptal and gabapentin as prescribed. I gave her a slip of paper for some labs as it references to Trileptal, that could be accomplished in her primary care office, if she is getting blood work anyway. We will see her in 6 months and otherwise appears to be doing well.

## 2018-11-23 NOTE — PROGRESS NOTES
Left-sided trigeminal neuralgia. Neurology Consult      Subjective:      Bolivar Byrnes is a 52 y.o. female who comes in today with left trigeminal neuralgia. Is currently on Trileptal 150 mg taking two 4 times a day and gabapentin 300 mg 6/day. I referenced a note from her primary care Dr. Mickey Garza from April of this year outlining a sodium of 126 but rebounded to 133 on repeat labs. I gave her a request of labs in case she is in primary care and needs lab work anyway. This would be periodic updates as she takes the Trileptal on a continuous basis for a look at the CBC with differential basic metabolic and liver function test.  Did not mention any new medical or surgical history today. Exam looked normal and will suggest a revisit here in 6 months. Current Outpatient Medications   Medication Sig Dispense Refill    OXcarbazepine (TRILEPTAL) 150 mg tablet TAKE 2 TABLETS BY MOUTH 4 TIMES A  Tab 1    gabapentin (NEURONTIN) 300 mg capsule TAKE 1 CAPSULE BY MOUTH SIX (6) TIMES DAILY. 180 Cap 11    LORazepam (ATIVAN) 0.5 mg tablet 1/2 po every day prn only. .. Indications: anxiety 12 Tab 0      Allergies   Allergen Reactions    Penicillins Hives     Past Medical History:   Diagnosis Date    Trigeminal neuralgia 11/28/2012      No past surgical history on file. Social History     Socioeconomic History    Marital status:      Spouse name: Not on file    Number of children: Not on file    Years of education: Not on file    Highest education level: Not on file   Social Needs    Financial resource strain: Not on file    Food insecurity - worry: Not on file    Food insecurity - inability: Not on file    Transportation needs - medical: Not on file   [employer] needs - non-medical: Not on file   Occupational History    Not on file   Tobacco Use    Smoking status: Never Smoker    Smokeless tobacco: Never Used   Substance and Sexual Activity    Alcohol use:  Yes     Alcohol/week: 2.0 oz     Types: 4 Cans of beer per week    Drug use: No    Sexual activity: Not on file   Other Topics Concern    Not on file   Social History Narrative    Not on file      Family History   Problem Relation Age of Onset    Stroke Mother     Cancer Sister       Visit Vitals  /66   Pulse 70   Resp 16   Ht 5' 3\" (1.6 m)   Wt 55.9 kg (123 lb 3.2 oz)   SpO2 94%   BMI 21.82 kg/m²        Review of Systems:   A comprehensive review of systems was negative except for that written in the HPI. Neuro Exam:     Appearance: The patient is well developed, well nourished, provides a coherent history and is in no acute distress. Mental Status: Oriented to time, place and person. Mood and affect appropriate. Cranial Nerves:   Intact visual fields. Fundi are benign. BELLE, EOM's full, no nystagmus, no ptosis. Facial sensation is normal. Corneal reflexes are intact. Facial movement is symmetric. Hearing is normal bilaterally. Palate is midline with normal sternocleidomastoid and trapezius muscles are normal. Tongue is midline. Motor:  5/5 strength in upper and lower proximal and distal muscles. Normal bulk and tone. No fasciculations. Reflexes:   Deep tendon reflexes 2+/4 and symmetrical.   Sensory:   Normal to touch, pinprick and vibration. Gait:  Normal gait. Tremor:   No tremor noted. Cerebellar:  No cerebellar signs present. Neurovascular:  Normal heart sounds and regular rhythm, peripheral pulses intact, and no carotid bruits. Assessment:   Left-sided trigeminal neuralgia. Is doing well and is comfortable with this suppression of pain with the Trileptal 150 mg taking two 4 times a day and gabapentin 300 mg taking 6/day. Gave her a lab list in case she is at her primary care and blood work is being obtained on that same visit. Revisit here 6 months. Plan:   Revisit 6 months.   Signed by :  Annika Sky MD

## 2018-12-12 RX ORDER — GABAPENTIN 300 MG/1
CAPSULE ORAL
Qty: 210 CAP | Refills: 6 | Status: SHIPPED | OUTPATIENT
Start: 2018-12-12 | End: 2019-07-12 | Stop reason: SDUPTHER

## 2019-01-02 ENCOUNTER — TELEPHONE (OUTPATIENT)
Dept: NEUROLOGY | Age: 54
End: 2019-01-02

## 2019-01-02 RX ORDER — OXCARBAZEPINE 150 MG/1
TABLET, FILM COATED ORAL
Qty: 720 TAB | Refills: 3 | Status: SHIPPED | OUTPATIENT
Start: 2019-01-02 | End: 2019-01-07 | Stop reason: SDUPTHER

## 2019-01-02 NOTE — TELEPHONE ENCOUNTER
----- Message from Sohail Hull sent at 1/2/2019 11:20 AM EST -----  Regarding: Dr. Lynette Severance  Pt stated she would like a call from the nurse regarding her Rx(\"Oxcarbazepine\") that now requires a 3 mth supply under her new insurance and [facility] has sent a request to the doctor, and would like to know if it has been received. Pt stated she will be out of medication on tomorrow.   Best contact number [phone](can leave message)

## 2019-01-04 ENCOUNTER — TELEPHONE (OUTPATIENT)
Dept: NEUROLOGY | Age: 54
End: 2019-01-04

## 2019-01-04 NOTE — TELEPHONE ENCOUNTER
Pt is calling  to let Richa know that Pharmacy is sending a revision on medication Oxcarbazepine Best # [phone]  Please note Pt is completely out of medication

## 2019-01-07 ENCOUNTER — TELEPHONE (OUTPATIENT)
Dept: NEUROLOGY | Age: 54
End: 2019-01-07

## 2019-01-07 RX ORDER — OXCARBAZEPINE 150 MG/1
TABLET, FILM COATED ORAL
Qty: 720 TAB | Refills: 3 | Status: SHIPPED | OUTPATIENT
Start: 2019-01-07 | End: 2019-11-10 | Stop reason: SDUPTHER

## 2019-01-07 NOTE — TELEPHONE ENCOUNTER
----- Message from Kristen Almanza sent at 1/7/2019 12:09 PM EST -----  Regarding: Dr. Wolff Dear  Pt requesting to speak with \"Mihai\" in the office. Pt stated, the prescript spoken about early to help with the pain does require a PA according to the pharmacist. Pt requesting for provider to fax over needed information [facility]. Pt requesting to speak with \"Mihai\" to confirm information was sent. Best contact number [phone] ext: [phone], if no answer please detail message.

## 2019-01-07 NOTE — TELEPHONE ENCOUNTER
Patient called again saying she needs her trileptal medication sent as soon as possible. Patient has been out since 01/03/19. She got a call form [employer] stating there was an issue with the quantity. Med Rx should read 2 tablets x4 a day for 90 days (total of 720 tablets). Please send to [facility] on [address] as soon as possible (not the one on [address]). Patient has begun experiencing symptoms from going without her medication. Patient can be reached at her work number [phone] ext [phone].

## 2019-01-07 NOTE — TELEPHONE ENCOUNTER
Patient is leaving another message about her Oxcarbazepine medication. She left a few messages last week about it. Her work number is [phone] ext [phone].

## 2019-01-09 NOTE — TELEPHONE ENCOUNTER
Patient called in regards to trileptal medication needing a PA. Submitted PA over the phone via [employer]. PA approved and test claim went through. Auth no. [id number]. Please send med to pharmacy if not sent already.

## 2019-07-12 DIAGNOSIS — G50.0 TRIGEMINAL NEURALGIA: Primary | ICD-10-CM

## 2019-07-12 RX ORDER — GABAPENTIN 300 MG/1
CAPSULE ORAL
Qty: 630 CAP | Refills: 1 | Status: SHIPPED | OUTPATIENT
Start: 2019-07-12 | End: 2019-11-26 | Stop reason: SDUPTHER

## 2019-11-26 ENCOUNTER — OFFICE VISIT (OUTPATIENT)
Dept: NEUROLOGY | Age: 54
End: 2019-11-26

## 2019-11-26 VITALS
BODY MASS INDEX: 21.07 KG/M2 | SYSTOLIC BLOOD PRESSURE: 100 MMHG | DIASTOLIC BLOOD PRESSURE: 58 MMHG | HEIGHT: 63 IN | HEART RATE: 78 BPM | WEIGHT: 118.9 LBS | RESPIRATION RATE: 16 BRPM | OXYGEN SATURATION: 97 %

## 2019-11-26 DIAGNOSIS — G50.0 TRIGEMINAL NEURALGIA: ICD-10-CM

## 2019-11-26 DIAGNOSIS — G50.0 TRIGEMINAL NEURALGIA OF LEFT SIDE OF FACE: ICD-10-CM

## 2019-11-26 DIAGNOSIS — G50.0 TRIGEMINAL NEURALGIA OF LEFT SIDE OF FACE: Primary | ICD-10-CM

## 2019-11-26 RX ORDER — GABAPENTIN 300 MG/1
CAPSULE ORAL
Qty: 630 CAP | Refills: 2 | Status: SHIPPED | OUTPATIENT
Start: 2019-11-26 | End: 2021-01-08 | Stop reason: SDUPTHER

## 2019-11-26 NOTE — PROGRESS NOTES
Left trigeminal neuralgia. Neurology Consult      Subjective:      Chapincito Royal is a 62 y.o. femaleWho comes in today with long-term follow-up of left trigeminal neuralgia. Currently on Trileptal 150 mg 2- 4 times a day. Also on gabapentin 300 mg 7/day. Has not had any recent blood work by primary care and would suggest updated labs that include liver function tests serum sodium and CBC with differential.  Is happy with facial pain control and offers no complaints or tolerability issues. Will suggest revisit and 6 months. Current Outpatient Medications   Medication Sig Dispense Refill    OXcarbazepine (TRILEPTAL) 150 mg tablet TAKE 2 TABLETS BY MOUTH 4 TIMES A  Tab 0    gabapentin (NEURONTIN) 300 mg capsule CAN TAKE UP TO 7/DAY AS NEEDED 630 Cap 1    LORazepam (ATIVAN) 0.5 mg tablet 1/2 po every day prn only. .. Indications: anxiety 12 Tab 0      Allergies   Allergen Reactions    Penicillins Hives     Past Medical History:   Diagnosis Date    Trigeminal neuralgia 11/28/2012      No past surgical history on file. Social History     Socioeconomic History    Marital status:      Spouse name: Not on file    Number of children: Not on file    Years of education: Not on file    Highest education level: Not on file   Occupational History    Not on file   Social Needs    Financial resource strain: Not on file    Food insecurity:     Worry: Not on file     Inability: Not on file    Transportation needs:     Medical: Not on file     Non-medical: Not on file   Tobacco Use    Smoking status: Never Smoker    Smokeless tobacco: Never Used   Substance and Sexual Activity    Alcohol use:  Yes     Alcohol/week: 3.3 standard drinks     Types: 4 Cans of beer per week    Drug use: No    Sexual activity: Not on file   Lifestyle    Physical activity:     Days per week: Not on file     Minutes per session: Not on file    Stress: Not on file   Relationships    Social connections:     Talks on phone: Not on file     Gets together: Not on file     Attends Muslim service: Not on file     Active member of club or organization: Not on file     Attends meetings of clubs or organizations: Not on file     Relationship status: Not on file    Intimate partner violence:     Fear of current or ex partner: Not on file     Emotionally abused: Not on file     Physically abused: Not on file     Forced sexual activity: Not on file   Other Topics Concern    Not on file   Social History Narrative    Not on file      Family History   Problem Relation Age of Onset    Stroke Mother     Cancer Sister       Visit Vitals  /58   Pulse 78   Resp 16   Ht 5' 3\" (1.6 m)   Wt 53.9 kg (118 lb 14.4 oz)   SpO2 97%   BMI 21.06 kg/m²        Review of Systems:   A comprehensive review of systems was negative except for that written in the HPI. Neuro Exam:     Appearance: The patient is well developed, well nourished, provides a coherent history and is in no acute distress. Mental Status: Oriented to time, place and person. Mood and affect appropriate. Cranial Nerves:   Intact visual fields. Fundi are benign. BELLE, EOM's full, no nystagmus, no ptosis. Facial sensation is normal. Corneal reflexes are intact. Facial movement is symmetric. Hearing is normal bilaterally. Palate is midline with normal sternocleidomastoid and trapezius muscles are normal. Tongue is midline. Motor:  5/5 strength in upper and lower proximal and distal muscles. Normal bulk and tone. No fasciculations. Reflexes:   Deep tendon reflexes 2+/4 and symmetrical.   Sensory:   Normal to touch, pinprick and vibration. Gait:  Normal gait. Tremor:   No tremor noted. Cerebellar:  No cerebellar signs present. Neurovascular:  Normal heart sounds and regular rhythm, peripheral pulses intact, and no carotid bruits. Assessment:   Left trigeminal neuralgia.   Will renew gabapentin by request and will continue with the Trileptal as previously prescribed. Check updated labs revisit 6 months. No new issues, exam looked normal today. Plan:   Revisit 6 months.   Signed by :  Zuleima Jacome MD

## 2019-11-26 NOTE — PATIENT INSTRUCTIONS
[facility]   Statement to Patients  April 1, 2014      In an effort to ensure the large volume of patient prescription refills is processed in the most efficient and expeditious manner, we are asking our patients to assist us by calling your Pharmacy for all prescription refills, this will include also your  Mail Order Pharmacy. The pharmacy will contact our office electronically to continue the refill process. Please do not wait until the last minute to call your pharmacy. We need at least 48 hours (2days) to fill prescriptions. We also encourage you to call your pharmacy before going to  your prescription to make sure it is ready. With regard to controlled substance prescription refill requests (narcotic refills) that need to be picked up at our office, we ask your cooperation by providing us with at least 72 hours (3days) notice that you will need a refill. We will not refill narcotic prescription refill requests after 4:00pm on any weekday, Monday through Thursday, or after 2:00pm on Fridays, or on the weekends. We encourage everyone to explore another way of getting your prescription refill request processed using [employer], our patient web portal through our electronic medical record system. [employer] is an efficient and effective way to communicate your medication request directly to the office and  downloadable as an tomasa on your smart phone . [employer] also features a review functionality that allows you to view your medication list as well as leave messages for your physician. Are you ready to get connected? If so please review the attatched instructions or speak to any of our staff to get you set up right away! Thank you so much for your cooperation. Should you have any questions please contact our Practice Administrator.     The Physicians and Staff,  [facility] [address]  What is a living will?    A living will is a legal form you use to write down the kind of care you want at the end of your life. It is used by the health professionals who will treat you if you aren't able to decide for yourself. If you put your wishes in writing, your loved ones and others will know what kind of care you want. They won't need to guess. This can ease your mind and be helpful to others. A living will is not the same as an estate or property will. An estate will explains what you want to happen with your money and property after you die. Is a living will a legal document? A living will is a legal document. Each state has its own laws about living [place]. If you move to another state, make sure that your living will is legal in the state where you now live. Or you might use a universal form that has been approved by many states. This kind of form can sometimes be completed and stored online. Your electronic copy will then be available wherever you have a connection to the Internet. In most cases, doctors will respect your wishes even if you have a form from a different state. · You don't need an  to complete a living will. But legal advice can be helpful if your state's laws are unclear, your health history is complicated, or your family can't agree on what should be in your living will. · You can change your living will at any time. Some people find that their wishes about end-of-life care change as their health changes. · In addition to making a living will, think about completing a medical power of  form. This form lets you name the person you want to make end-of-life treatment decisions for you (your \"health care agent\") if you're not able to. Many hospitals and nursing homes will give you the forms you need to complete a living will and a medical power of . · Your living will is used only if you can't make or communicate decisions for yourself anymore.  If you become able to make decisions again, you can accept or refuse any treatment, no matter what you wrote in your living will. · Your state may offer an online registry. This is a place where you can store your living will online so the doctors and nurses who need to treat you can find it right away. What should you think about when creating a living will? Talk about your end-of-life wishes with your family members and your doctor. Let them know what you want. That way the people making decisions for you won't be surprised by your choices. Think about these questions as you make your living will:  · Do you know enough about life support methods that might be used? If not, talk to your doctor so you know what might be done if you can't breathe on your own, your heart stops, or you're unable to swallow. · What things would you still want to be able to do after you receive life-support methods? Would you want to be able to walk? To speak? To eat on your own? To live without the help of machines? · If you have a choice, where do you want to be cared for? In your home? At a hospital or nursing home? · Do you want certain Judaism practices performed if you become very ill? · If you have a choice at the end of your life, where would you prefer to die? At home? In a hospital or nursing home? Somewhere else? · Would you prefer to be buried or cremated? · Do you want your organs to be donated after you die? What should you do with your living will? · Make sure that your family members and your health care agent have copies of your living will. · Give your doctor a copy of your living will to keep in your medical record. If you have more than one doctor, make sure that each one has a copy. · You may want to put a copy of your living will where it can be easily found. Where can you learn more? Go to http://stuart-sae.info/. Enter D375 in the search box to learn more about \"Learning About Living [place]. \"  Current as of: April 1, 2019  Content Version: 12.2  © 6770-1571 [employer]. Care instructions adapted under license by [employer] (which disclaims liability or warranty for this information). If you have questions about a medical condition or this instruction, always ask your healthcare professional. [address] any warranty or liability for your use of this information. Patient history reviewed patient examined. Will check updated labs and refill the gabapentin by request.  Revisit 6 months.

## 2019-11-26 NOTE — LETTER
11/26/19 Patient: Naz Gao YOB: 1965 Date of Visit: 11/26/2019 Alex Daly MD 
[address] [place] [address] VIA Facsimile: [phone] Dear Alex Daly MD, Thank you for referring Ms. Manuel Duong to [facility] for evaluation. My notes for this consultation are attached. If you have questions, please do not hesitate to call me. I look forward to following your patient along with you.  
 
 
Sincerely, 
 
Berkley Farrell MD

## 2019-12-05 ENCOUNTER — TELEPHONE (OUTPATIENT)
Dept: NEUROLOGY | Age: 54
End: 2019-12-05

## 2019-12-05 RX ORDER — OXCARBAZEPINE 150 MG/1
TABLET, FILM COATED ORAL
Qty: 720 TAB | Refills: 1 | Status: SHIPPED | OUTPATIENT
Start: 2019-12-05 | End: 2020-03-11 | Stop reason: SDUPTHER

## 2020-03-18 RX ORDER — OXCARBAZEPINE 300 MG/1
300 TABLET, FILM COATED ORAL 4 TIMES DAILY
Qty: 360 TAB | Refills: 0 | Status: SHIPPED | OUTPATIENT
Start: 2020-03-18 | End: 2020-06-08

## 2020-03-18 NOTE — TELEPHONE ENCOUNTER
Patient insurance needs it to be the Trileptal 300 mg tablet one tab 4 times daily.      Review and sign if approved

## 2020-03-25 DIAGNOSIS — F41.9 ANXIETY: ICD-10-CM

## 2020-03-25 NOTE — TELEPHONE ENCOUNTER
----- Message from Kelvin Orona sent at 3/25/2020  1:27 PM EDT -----  Regarding: Dr. Syed Blair  General Message/Vendor Calls    Caller's first and last name:      Reason for call: Requesting Rx \"Ativan\" smallest dose sent to [facility] pharmacy on [address] [facility], [address] required yes/no and why: yes      Best contact number(s): [phone]      Details to clarify the request:      Kelvin Orona

## 2020-03-30 RX ORDER — LORAZEPAM 0.5 MG/1
TABLET ORAL
Qty: 12 TAB | Refills: 0 | Status: SHIPPED | OUTPATIENT
Start: 2020-03-30 | End: 2022-09-08

## 2020-06-08 RX ORDER — OXCARBAZEPINE 300 MG/1
TABLET, FILM COATED ORAL
Qty: 360 TAB | Refills: 0 | Status: SHIPPED | OUTPATIENT
Start: 2020-06-08 | End: 2021-01-08 | Stop reason: SDUPTHER

## 2021-01-04 ENCOUNTER — TRANSCRIBE ORDER (OUTPATIENT)
Dept: INTERNAL MEDICINE CLINIC | Age: 56
End: 2021-01-04

## 2021-01-08 DIAGNOSIS — G50.0 TRIGEMINAL NEURALGIA: ICD-10-CM

## 2021-01-08 RX ORDER — OXCARBAZEPINE 300 MG/1
TABLET, FILM COATED ORAL
Qty: 120 TAB | Refills: 1 | Status: SHIPPED | OUTPATIENT
Start: 2021-01-08 | End: 2021-03-15

## 2021-01-08 RX ORDER — GABAPENTIN 300 MG/1
CAPSULE ORAL
Qty: 210 CAP | Refills: 1 | Status: SHIPPED | OUTPATIENT
Start: 2021-01-08 | End: 2021-01-12 | Stop reason: SDUPTHER

## 2021-01-08 RX ORDER — GABAPENTIN 300 MG/1
CAPSULE ORAL
Qty: 630 CAP | Refills: 2 | Status: CANCELLED | OUTPATIENT
Start: 2021-01-08

## 2021-01-08 RX ORDER — OXCARBAZEPINE 300 MG/1
TABLET, FILM COATED ORAL
Qty: 360 TAB | Refills: 0 | Status: CANCELLED | OUTPATIENT
Start: 2021-01-08

## 2021-01-12 DIAGNOSIS — G50.0 TRIGEMINAL NEURALGIA: ICD-10-CM

## 2021-01-12 RX ORDER — GABAPENTIN 300 MG/1
CAPSULE ORAL
Qty: 100 CAP | Refills: 0 | Status: SHIPPED | OUTPATIENT
Start: 2021-01-12 | End: 2021-01-13 | Stop reason: SDUPTHER

## 2021-01-12 NOTE — TELEPHONE ENCOUNTER
----- Message from Shirley Virk sent at 1/12/2021 11:50 AM EST -----  Regarding: Dr Jay/rx refill  Medication Refill    Caller (if not patient):      Relationship of caller (if not patient):      Best contact number(s):[phone]      Name of medication and dosage if known:Gabapentin 300mg    Is patient out of this medication (yes/no):yes      Pharmacy name:[facility]     Pharmacy listed in chart? (yes/no):yes    Pharmacy phone       Details to clarify the request:this is for her local pharmacy until her mail order express orders comes in, this is her 2nd request, please call in as soon as possible       Shirley Virk

## 2021-01-13 DIAGNOSIS — G50.0 TRIGEMINAL NEURALGIA: ICD-10-CM

## 2021-01-13 RX ORDER — GABAPENTIN 300 MG/1
CAPSULE ORAL
Qty: 100 CAP | Refills: 0 | Status: SHIPPED | OUTPATIENT
Start: 2021-01-13 | End: 2021-03-15

## 2021-01-13 NOTE — TELEPHONE ENCOUNTER
----- Message from Dolly Sena sent at 1/13/2021  8:59 AM EST -----  Regarding: Dr. Nora Castañeda  General Message/Vendor Calls    Caller's first and last name: Pt      Reason for call: Gabapentin script      Callback required yes/no and why: Yes      Best contact number(s):[phone]      Details to clarify the request: Pt is calling in regards to her Gabapentin. She was requesting a small supply be sent to [facility] pharmacy until [employer] is able to send original to her. She has not heard anything back from the office and would like to make sure this gets done. She took her last Gabapentin this morning. Please advise.       Dolly Sena

## 2021-01-27 ENCOUNTER — OFFICE VISIT (OUTPATIENT)
Dept: NEUROLOGY | Age: 56
End: 2021-01-27
Payer: COMMERCIAL

## 2021-01-27 VITALS
OXYGEN SATURATION: 97 % | HEIGHT: 63 IN | BODY MASS INDEX: 20.3 KG/M2 | RESPIRATION RATE: 18 BRPM | WEIGHT: 114.6 LBS | SYSTOLIC BLOOD PRESSURE: 124 MMHG | DIASTOLIC BLOOD PRESSURE: 60 MMHG | HEART RATE: 76 BPM

## 2021-01-27 DIAGNOSIS — G50.0 TRIGEMINAL NEURALGIA OF LEFT SIDE OF FACE: Primary | ICD-10-CM

## 2021-01-27 PROCEDURE — 99214 OFFICE O/P EST MOD 30 MIN: CPT | Performed by: SPECIALIST

## 2021-01-27 NOTE — LETTER
1/27/2021 Patient: Izzy Soto YOB: 1965 Date of Visit: 1/27/2021 Luci Yap MD 
[address] [address] Via Fax: [phone] Dear Luci Yap MD, Thank you for referring Ms. Anita Puckett to [facility] for evaluation. My notes for this consultation are attached. If you have questions, please do not hesitate to call me. I look forward to following your patient along with you.  
 
 
Sincerely, 
 
Rayna Ram MD

## 2021-01-27 NOTE — PATIENT INSTRUCTIONS
Patient history reviewed patient examined. Currently does not need a refill and sounds like on one occasion had an accidental overdose of the Trileptal and hopefully will not happen again. Currently medicine including gabapentin seems to be doing its job so we will leave well enough alone. Does not reference any new medical or surgical history to me today and will suggest revisit in 1 year since she has been steady state. Have a great year stay safe and see you next year.

## 2021-01-27 NOTE — PROGRESS NOTES
Neurology Consult      Subjective:      Connor Mcfarland is a 60 y.o. female who comes in today with longstanding left facial trigeminal neuralgia. Medicine seems to work and he currently goes to Trileptal 150 mg taking 2 4 times a day and gabapentin 300 mg 7 total per day. Did say on one occasion she miss configured her Trileptal dose and took too much and felt like she was out of commission for the better part of the day. Apparently does affiliate with a local trigeminal neuralgia patient support group. Has no downside to taking the medicines but again notes from retrospect that it can influence level of consciousness dizziness and GI discomfort. Seems to be comfortable with the notion of following up in 1 year. Sees primary care physician Dr. Jackie Bear and I am sure he does periodic basic labs that would otherwise cover CBC with differential basic chemistries and liver functions as an CMP. I thought she looked strictly baseline and seemed to be comfortable and reassured. Have a great year and stay safe. Current Outpatient Medications   Medication Sig Dispense Refill    gabapentin (NEURONTIN) 300 mg capsule 7 po per day  Indications: neuropathic pain 100 Cap 0    OXcarbazepine (TRILEPTAL) 300 mg tablet TAKE 1 TABLET BY MOUTH 4 TIMES A  Tab 1    LORazepam (ATIVAN) 0.5 mg tablet 1/2 po every day prn only. .. Indications: anxious 12 Tab 0      Allergies   Allergen Reactions    Penicillins Hives     Past Medical History:   Diagnosis Date    Trigeminal neuralgia 11/28/2012      No past surgical history on file.    Social History     Socioeconomic History    Marital status:      Spouse name: Not on file    Number of children: Not on file    Years of education: Not on file    Highest education level: Not on file   Occupational History    Not on file   Social Needs    Financial resource strain: Not on file    Food insecurity     Worry: Not on file     Inability: Not on file   [facility] Transportation needs     Medical: Not on file     Non-medical: Not on file   Tobacco Use    Smoking status: Never Smoker    Smokeless tobacco: Never Used   Substance and Sexual Activity    Alcohol use: Yes     Alcohol/week: 3.3 standard drinks     Types: 4 Cans of beer per week    Drug use: No    Sexual activity: Not on file   Lifestyle    Physical activity     Days per week: Not on file     Minutes per session: Not on file    Stress: Not on file   Relationships    Social connections     Talks on phone: Not on file     Gets together: Not on file     Attends Scientologist service: Not on file     Active member of club or organization: Not on file     Attends meetings of clubs or organizations: Not on file     Relationship status: Not on file    Intimate partner violence     Fear of current or ex partner: Not on file     Emotionally abused: Not on file     Physically abused: Not on file     Forced sexual activity: Not on file   Other Topics Concern    Not on file   Social History Narrative    Not on file      Family History   Problem Relation Age of Onset    Stroke Mother     Cancer Sister       Visit Vitals  /60   Pulse 76   Resp 18   Ht 5' 3\" (1.6 m)   Wt 52 kg (114 lb 9.6 oz)   SpO2 97%   BMI 20.30 kg/m²        Review of Systems:   A comprehensive review of systems was negative except for that written in the HPI. Neuro Exam:     Appearance: The patient is well developed, well nourished, provides a coherent history and is in no acute distress. Mental Status: Oriented to time, place and person. Mood and affect appropriate. Cranial Nerves:   Intact visual fields. Fundi are benign. BELLE, EOM's full, no nystagmus, no ptosis. Facial sensation is normal. Corneal reflexes are intact. Facial movement is symmetric. Hearing is normal bilaterally. Palate is midline with normal sternocleidomastoid and trapezius muscles are normal. Tongue is midline.    Motor:  5/5 strength in upper and lower proximal and distal muscles. Normal bulk and tone. No fasciculations. Reflexes:   Deep tendon reflexes 2+/4 and symmetrical.   Sensory:   Normal to touch, pinprick and vibration. Gait:  Normal gait. Tremor:   No tremor noted. Cerebellar:  No cerebellar signs present. Neurovascular:  Normal heart sounds and regular rhythm, peripheral pulses intact, and no carotid bruits. Assessment:   Left-sided trigeminal neuralgia. Recommendations go to continuing Trileptal as is and gabapentin. Did learn a valuable lesson once when she took too many Trileptal and I do not think this can happen again based on her experience. Since she is steady state will recommend a revisit in 1 year and she can always come back sooner if needed. Plan:   Revisit 1 year.   Signed by :  Shayy Soliz MD

## 2021-03-15 DIAGNOSIS — G50.0 TRIGEMINAL NEURALGIA: ICD-10-CM

## 2021-03-15 RX ORDER — OXCARBAZEPINE 300 MG/1
TABLET, FILM COATED ORAL
Qty: 120 TAB | Refills: 11 | Status: SHIPPED | OUTPATIENT
Start: 2021-03-15 | End: 2022-02-10 | Stop reason: SDUPTHER

## 2021-03-15 RX ORDER — GABAPENTIN 300 MG/1
CAPSULE ORAL
Qty: 100 CAP | Refills: 0 | Status: SHIPPED | OUTPATIENT
Start: 2021-03-15 | End: 2021-04-26

## 2021-09-14 DIAGNOSIS — G50.0 TRIGEMINAL NEURALGIA: ICD-10-CM

## 2021-09-14 RX ORDER — GABAPENTIN 300 MG/1
CAPSULE ORAL
Qty: 630 CAPSULE | Refills: 0 | Status: SHIPPED | OUTPATIENT
Start: 2021-09-14 | End: 2022-02-10 | Stop reason: SDUPTHER

## 2021-09-14 NOTE — TELEPHONE ENCOUNTER
----- Message from Corinne Louis sent at 9/14/2021  2:53 PM EDT -----  Regarding: Dr. Dominguez Núñez / Telephone  Medication Refill    Caller (if not patient): self      Relationship of caller (if not patient): na      Best contact number(s): [phone]      Name of medication and dosage if known: gabapentin (NEURONTIN) 300 mg      Is patient out of this medication (yes/no): no      Pharmacy name: [facility] [id number] listed in chart? (yes/no): Yes  Pharmacy phone number: [phone]       Details to clarify the request: pt stated she only received 30 days worth of the gabapentin when she normally gets 90 days worth.  She would like an prescription filled for the remainder of the pills      Corinne Louis

## 2022-02-10 ENCOUNTER — OFFICE VISIT (OUTPATIENT)
Dept: NEUROLOGY | Age: 57
End: 2022-02-10
Payer: COMMERCIAL

## 2022-02-10 VITALS
WEIGHT: 120 LBS | RESPIRATION RATE: 14 BRPM | HEIGHT: 63 IN | OXYGEN SATURATION: 100 % | SYSTOLIC BLOOD PRESSURE: 118 MMHG | HEART RATE: 66 BPM | DIASTOLIC BLOOD PRESSURE: 68 MMHG | BODY MASS INDEX: 21.26 KG/M2

## 2022-02-10 DIAGNOSIS — G50.0 TRIGEMINAL NEURALGIA: ICD-10-CM

## 2022-02-10 DIAGNOSIS — G50.0 TRIGEMINAL NEURALGIA OF LEFT SIDE OF FACE: Primary | ICD-10-CM

## 2022-02-10 PROCEDURE — 99213 OFFICE O/P EST LOW 20 MIN: CPT | Performed by: SPECIALIST

## 2022-02-10 RX ORDER — ESCITALOPRAM OXALATE 10 MG/1
10 TABLET ORAL DAILY
COMMUNITY
Start: 2021-11-16

## 2022-02-10 RX ORDER — OXCARBAZEPINE 300 MG/1
TABLET, FILM COATED ORAL
Qty: 360 TABLET | Refills: 3 | Status: SHIPPED | OUTPATIENT
Start: 2022-02-10 | End: 2022-02-22

## 2022-02-10 RX ORDER — GABAPENTIN 300 MG/1
CAPSULE ORAL
Qty: 630 CAPSULE | Refills: 3 | Status: SHIPPED | OUTPATIENT
Start: 2022-02-10 | End: 2022-09-08 | Stop reason: SDUPTHER

## 2022-02-10 NOTE — LETTER
2/10/2022    Patient: Evan Driscoll   YOB: 1965   Date of Visit: 2/10/2022     [phone]  Via Fax: [phone]    Dear Maryjo Bain MD,      Thank you for referring Ms. Ever Davis to [address] [place] [address] for evaluation. My notes for this consultation are attached. If you have questions, please do not hesitate to call me. I look forward to following your patient along with you.       Sincerely,    Shiva Sarmiento MD

## 2022-02-10 NOTE — PATIENT INSTRUCTIONS
Patient with history of left trigeminal neuralgia and is doing well on her medications which will be renewed today. No downside to taking her medicine and she follows up regularly with her primary care. No new issues and will suggest a revisit in 1 year.

## 2022-02-10 NOTE — PROGRESS NOTES
Neurology Consult      Subjective:      Nilda Richey is a 60 y.o. female who comes in today with follow-up of left trigeminal neuralgia. Has had no sensory symptoms on the left side of the face and that includes no pain component. Currently takes Trileptal 150 mg 2 4 times daily and gabapentin 300 mg 7/day. Has had no downside to taking her medications and regularly follows up with her primary care. The only new topic on the discussion was she had left total hip replacement successfully with [facility]. Exam looks normal today and medicine renewed by request.  Revisit 1 year. Current Outpatient Medications   Medication Sig Dispense Refill    escitalopram oxalate (LEXAPRO) 10 mg tablet Take 10 mg by mouth daily.  gabapentin (NEURONTIN) 300 mg capsule 7 po per day  Indications: neuropathic pain 630 Capsule 0    OXcarbazepine (TRILEPTAL) 300 mg tablet TAKE 1 TABLET FOUR TIMES A  Tab 11    LORazepam (ATIVAN) 0.5 mg tablet 1/2 po every day prn only. .. Indications: anxious (Patient not taking: Reported on 2/10/2022) 12 Tab 0      Allergies   Allergen Reactions    Penicillins Hives     Past Medical History:   Diagnosis Date    Trigeminal neuralgia 11/28/2012      No past surgical history on file. Social History     Socioeconomic History    Marital status:      Spouse name: Not on file    Number of children: Not on file    Years of education: Not on file    Highest education level: Not on file   Occupational History    Not on file   Tobacco Use    Smoking status: Never Smoker    Smokeless tobacco: Never Used   Substance and Sexual Activity    Alcohol use:  Yes     Alcohol/week: 3.3 standard drinks     Types: 4 Cans of beer per week    Drug use: No    Sexual activity: Not on file   Other Topics Concern    Not on file   Social History Narrative    Not on file     Social Determinants of Health     Financial Resource Strain:     Difficulty of Paying Living Expenses: Not on file   Food Insecurity:     Worried About Running Out of Food in the Last Year: Not on file    Ran Out of Food in the Last Year: Not on file   Transportation Needs:     Lack of Transportation (Medical): Not on file    Lack of Transportation (Non-Medical): Not on file   Physical Activity:     Days of Exercise per Week: Not on file    Minutes of Exercise per Session: Not on file   Stress:     Feeling of Stress : Not on file   Social Connections:     Frequency of Communication with Friends and Family: Not on file    Frequency of Social Gatherings with Friends and Family: Not on file    Attends Denominational Services: Not on file    Active Member of [address] [place] [address] shopp or Organizations: Not on file    Attends Club or Organization Meetings: Not on file    Marital Status: Not on file   Intimate Partner Violence:     Fear of Current or Ex-Partner: Not on file    Emotionally Abused: Not on file    Physically Abused: Not on file    Sexually Abused: Not on file   Housing Stability:     Unable to Pay for Housing in the Last Year: Not on file    Number of [place] in the Last Year: Not on file    Unstable Housing in the Last Year: Not on file      Family History   Problem Relation Age of Onset    Stroke Mother     Cancer Sister       Visit Vitals  /68 (BP 1 Location: Left upper arm, BP Patient Position: Sitting)   Pulse 66   Resp 14   Ht 5' 3\" (1.6 m)   Wt 54.4 kg (120 lb)   SpO2 100%   BMI 21.26 kg/m²        Review of Systems:   Comprehensive review of systems negative or as offered in the history of present illness. Neuro Exam:     Appearance: The patient is well developed, well nourished, provides a coherent history and is in no acute distress. Mental Status: Oriented to time, place and person. Mood and affect appropriate. Cranial Nerves:   Intact visual fields. Fundi are benign. BELLE, EOM's full, no nystagmus, no ptosis.  Facial sensation is normal. Corneal reflexes are intact. Facial movement is symmetric. Hearing is normal bilaterally. Palate is midline with normal sternocleidomastoid and trapezius muscles are normal. Tongue is midline. Motor:  5/5 strength in upper and lower proximal and distal muscles. Normal bulk and tone. No fasciculations. Reflexes:   Deep tendon reflexes 2+/4 and symmetrical.   Sensory:   Normal to touch, pinprick and vibration. Gait:  Normal gait. Tremor:   No tremor noted. Cerebellar:  No cerebellar signs present. Neurovascular:  Normal heart sounds and regular rhythm, peripheral pulses intact, and no carotid bruits. Assessment:   Left trigeminal neuralgia. Has had no left facial symptoms and no pain. Will recommend continuation of medicines as they are outlined above. Revisit 1 year. Plan:   Revisit 1 year.   Signed by :  Gabriela Crump MD

## 2022-02-22 RX ORDER — OXCARBAZEPINE 300 MG/1
TABLET, FILM COATED ORAL
Qty: 360 TABLET | Refills: 3 | Status: SHIPPED | OUTPATIENT
Start: 2022-02-22 | End: 2022-09-08 | Stop reason: SDUPTHER

## 2022-09-08 ENCOUNTER — OFFICE VISIT (OUTPATIENT)
Dept: NEUROLOGY | Age: 57
End: 2022-09-08
Payer: COMMERCIAL

## 2022-09-08 VITALS
OXYGEN SATURATION: 99 % | RESPIRATION RATE: 11 BRPM | SYSTOLIC BLOOD PRESSURE: 124 MMHG | HEART RATE: 44 BPM | DIASTOLIC BLOOD PRESSURE: 76 MMHG

## 2022-09-08 DIAGNOSIS — G50.0 TRIGEMINAL NEURALGIA: ICD-10-CM

## 2022-09-08 DIAGNOSIS — G50.0 TRIGEMINAL NEURALGIA OF LEFT SIDE OF FACE: Primary | ICD-10-CM

## 2022-09-08 PROCEDURE — 99213 OFFICE O/P EST LOW 20 MIN: CPT | Performed by: SPECIALIST

## 2022-09-08 RX ORDER — GABAPENTIN 300 MG/1
CAPSULE ORAL
Qty: 630 CAPSULE | Refills: 1 | Status: SHIPPED | OUTPATIENT
Start: 2022-09-08

## 2022-09-08 RX ORDER — OXCARBAZEPINE 300 MG/1
TABLET, FILM COATED ORAL
Qty: 360 TABLET | Refills: 1 | Status: SHIPPED | OUTPATIENT
Start: 2022-09-08 | End: 2022-10-26 | Stop reason: SDUPTHER

## 2022-09-08 NOTE — LETTER
9/8/2022    Patient: Thuan Child   YOB: 1965   Date of Visit: 9/8/2022     [phone]  Via Fax: [phone]    Dear Pamela Johnson MD,      Thank you for referring Ms. Jose Angel Larkin to [address] [place] [address] for evaluation. My notes for this consultation are attached. If you have questions, please do not hesitate to call me. I look forward to following your patient along with you.       Sincerely,    Елена Granados MD

## 2022-09-08 NOTE — PROGRESS NOTES
Neurology Consult      Subjective:      Thuan Child is a 59 y.o. female patient follows up on long-term care of left trigeminal neuralgia. It is a predominant left V2 distribution discomfort but is currently under excellent control. Currently on Trileptal 300 mg taking 2 4 times a day and gabapentin 300 mg taking 7/day. No downside to taking these products. References no new medical or surgical issues and is seen in her usual 6-month timeframe. Exam looks normal today and suggest revisit in 6 months. If the weather is less than best by all means reschedule. Current Outpatient Medications   Medication Sig Dispense Refill    OXcarbazepine (TRILEPTAL) 300 mg tablet Okay see 2 p.o. 4 times daily 360 Tablet 1    gabapentin (NEURONTIN) 300 mg capsule 7 po per day  Indications: neuropathic pain 630 Capsule 1    escitalopram oxalate (LEXAPRO) 10 mg tablet Take 10 mg by mouth daily. Allergies   Allergen Reactions    Penicillins Hives     Past Medical History:   Diagnosis Date    Trigeminal neuralgia 11/28/2012      No past surgical history on file. Social History     Socioeconomic History    Marital status:      Spouse name: Not on file    Number of children: Not on file    Years of education: Not on file    Highest education level: Not on file   Occupational History    Not on file   Tobacco Use    Smoking status: Never    Smokeless tobacco: Never   Substance and Sexual Activity    Alcohol use:  Yes     Alcohol/week: 3.3 standard drinks     Types: 4 Cans of beer per week    Drug use: No    Sexual activity: Not on file   Other Topics Concern    Not on file   Social History Narrative    Not on file     Social Determinants of Health     Financial Resource Strain: Not on file   Food Insecurity: Not on file   Transportation Needs: Not on file   Physical Activity: Not on file   Stress: Not on file   Social Connections: Not on file   Intimate Partner Violence: Not on file   Housing Stability: Not on file      Family History   Problem Relation Age of Onset    Stroke Mother     Cancer Sister       Visit Vitals  /76 (BP 1 Location: Left upper arm, BP Patient Position: Sitting, BP Cuff Size: Adult)   Pulse (!) 44   Resp 11   SpO2 99%        Review of Systems:   A comprehensive review of systems was negative except for that written in the HPI. Neuro Exam:     Appearance: The patient is well developed, well nourished, provides a coherent history and is in no acute distress. Mental Status: Oriented to time, place and person. Mood and affect appropriate. Cranial Nerves:   Intact visual fields. Fundi are benign. BELLE, EOM's full, no nystagmus, no ptosis. Facial sensation is normal. Corneal reflexes are intact. Facial movement is symmetric. Hearing is normal bilaterally. Palate is midline with normal sternocleidomastoid and trapezius muscles are normal. Tongue is midline. Motor:  5/5 strength in upper and lower proximal and distal muscles. Normal bulk and tone. No fasciculations. Reflexes:   Deep tendon reflexes 2+/4 and symmetrical.   Sensory:   Normal to touch, pinprick and vibration. Gait:  Normal gait. Tremor:   No tremor noted. Cerebellar:  No cerebellar signs present. Neurovascular:  Normal heart sounds and regular rhythm, peripheral pulses intact, and no carotid bruits. Assessment:   Left trigeminal neuralgia. Doing well and will renew medicines by request.  Suggest revisit in the usual and customary 6-month timeframe and no new concerns or issues on today's face-to-face. Plan:   Revisit 6 months. If weather is inclement should certainly reschedule.   Signed by :  Yahir Kaur MD

## 2022-09-08 NOTE — PATIENT INSTRUCTIONS
Usual follow-up for left trigeminal neuralgia and patient is doing well on existing medicine. We will renew these by request and suggest follow-up in 6 months. If the weather is less than agreeable could always reschedule.

## 2022-10-26 RX ORDER — OXCARBAZEPINE 300 MG/1
300 TABLET, FILM COATED ORAL 4 TIMES DAILY
Qty: 360 TABLET | Refills: 1 | Status: SHIPPED | OUTPATIENT
Start: 2022-10-26

## 2023-03-09 ENCOUNTER — OFFICE VISIT (OUTPATIENT)
Dept: NEUROLOGY | Age: 58
End: 2023-03-09
Payer: COMMERCIAL

## 2023-03-09 VITALS
HEART RATE: 63 BPM | HEIGHT: 63 IN | SYSTOLIC BLOOD PRESSURE: 110 MMHG | TEMPERATURE: 97.3 F | BODY MASS INDEX: 21.26 KG/M2 | DIASTOLIC BLOOD PRESSURE: 68 MMHG | OXYGEN SATURATION: 99 % | RESPIRATION RATE: 14 BRPM | WEIGHT: 120 LBS

## 2023-03-09 DIAGNOSIS — G50.0 TRIGEMINAL NEURALGIA: ICD-10-CM

## 2023-03-09 DIAGNOSIS — G50.0 TRIGEMINAL NEURALGIA OF LEFT SIDE OF FACE: Primary | ICD-10-CM

## 2023-03-09 PROCEDURE — 99213 OFFICE O/P EST LOW 20 MIN: CPT | Performed by: SPECIALIST

## 2023-03-09 RX ORDER — OXCARBAZEPINE 300 MG/1
300 TABLET, FILM COATED ORAL 4 TIMES DAILY
Qty: 360 TABLET | Refills: 1 | Status: SHIPPED | OUTPATIENT
Start: 2023-03-09

## 2023-03-09 RX ORDER — GABAPENTIN 300 MG/1
CAPSULE ORAL
Qty: 630 CAPSULE | Refills: 1 | Status: SHIPPED | OUTPATIENT
Start: 2023-03-09

## 2023-03-09 NOTE — PATIENT INSTRUCTIONS
Patient history viewed patient examined. Will recommend continuation of supportive therapy for the neuropathic pain of left trigeminal neuralgia. Would recommend a follow-up visit in 6 months here.   Medicine renewed by request.  Gina Kawasaki a pleasure working with you through the years and all the best.

## 2023-03-09 NOTE — LETTER
3/9/2023    Patient: Cheryle Romero   YOB: 1965   Date of Visit: 3/9/2023     [phone]  Via Fax: [phone]    Dear Katelynn Duong MD,      Thank you for referring Ms. Ariadna Zelaya to [address] [place] [address] for evaluation. My notes for this consultation are attached. If you have questions, please do not hesitate to call me. I look forward to following your patient along with you.       Sincerely,    Mildred Triplett MD

## 2023-03-09 NOTE — PROGRESS NOTES
Neurology Consult      Subjective:      Colin Sanchez is a 59 y.o. female who comes in today on revisit of left V2 distribution trigeminal neuralgia and neuropathic pain. Is on her staple medicine of Trileptal 300 mg taking 2 -4 times daily and gabapentin 300 mg 7/day. Medicine renewed by request.  Did not reference any new medical surgical history and I thought her exam was strictly baseline. Again area of regional pain isolates to the left cheek area and has no intraoral components. I suggest revisit here in 6 months. We will set her up with our nurse practitioner who comes to this office location on , as I am retiring after . It has been great working with her through the years and I hope her success can continue. Current Outpatient Medications   Medication Sig Dispense Refill    gabapentin (NEURONTIN) 300 mg capsule 7 po per day  Indications: neuropathic pain  Indications: neuropathic pain 630 Capsule 1    OXcarbazepine (TRILEPTAL) 300 mg tablet Take 1 Tablet by mouth four (4) times daily. 360 Tablet 1    escitalopram oxalate (LEXAPRO) 10 mg tablet Take 10 mg by mouth daily.         Allergies   Allergen Reactions    Penicillins Hives     Past Medical History:   Diagnosis Date    Decreased libido 2013    Low testosterone    Frequent Yeast vaginitis     Postmenopausal atrophic vaginitis 2015    Trigeminal neuralgia 2012    Vitamin B 12 deficiency     Vitamin D deficiency 2013    21      Past Surgical History:   Procedure Laterality Date    HX BREAST BIOPSY Left     Negative    HX  SECTION  10/1992      Social History     Socioeconomic History    Marital status:      Spouse name: Not on file    Number of children: 1    Years of education: Not on file    Highest education level: Not on file   Occupational History    Occupation: Office Work   Tobacco Use    Smoking status: Former     Types: Cigarettes     Start date:      Quit date: 1998     Years since quittin.2    Smokeless tobacco: Never   Vaping Use    Vaping Use: Never used   Substance and Sexual Activity    Alcohol use: Yes     Alcohol/week: 3.3 standard drinks     Types: 4 Cans of beer per week    Drug use: Never    Sexual activity: Yes     Partners: Male     Birth control/protection: None   Other Topics Concern    Not on file   Social History Narrative    Not on file     Social Determinants of Health     Financial Resource Strain: Not on file   Food Insecurity: Not on file   Transportation Needs: Not on file   Physical Activity: Not on file   Stress: Not on file   Social Connections: Not on file   Intimate Partner Violence: Not on file   Housing Stability: Not on file      Family History   Problem Relation Age of Onset    Stroke Mother     Breast Cancer Sister 43        pt declines brca testing      Visit Vitals  /68   Pulse 63   Temp 97.3 °F (36.3 °C)   Resp 14   Ht 5' 3\" (1.6 m)   Wt 54.4 kg (120 lb)   SpO2 99%   BMI 21.26 kg/m²        Review of Systems:   A comprehensive review of systems was negative except for that written in the HPI. Neuro Exam:     Appearance: The patient is well developed, well nourished, provides a coherent history and is in no acute distress. Mental Status: Oriented to time, place and person. Mood and affect appropriate. Cranial Nerves:   Intact visual fields. Fundi are benign. BELLE, EOM's full, no nystagmus, no ptosis. Facial sensation is normal. Corneal reflexes are intact. Facial movement is symmetric. Hearing is normal bilaterally. Palate is midline with normal sternocleidomastoid and trapezius muscles are normal. Tongue is midline. Motor:  5/5 strength in upper and lower proximal and distal muscles. Normal bulk and tone. No fasciculations. Reflexes:   Deep tendon reflexes 2+/4 and symmetrical.   Sensory:   Normal to touch, pinprick and vibration. Gait:  Normal gait. Tremor:   No tremor noted.    Cerebellar:  No cerebellar signs present. Neurovascular:  Normal heart sounds and regular rhythm, peripheral pulses intact, and no carotid bruits. Assessment:   Left V2 trigeminal neuralgia. Appears to be at her baseline stable status and by request will renew the Trileptal and gabapentin as previously prescribed. Suggest revisit here in 6 months. All the best and has been a pleasure working with you. Plan:   Revisit 6 months.   Signed by :  Reema Dutta MD

## 2023-03-09 NOTE — PROGRESS NOTES
Chief Complaint   Patient presents with    Follow-up     Patient presents today for a follow up with trigeminal neuralgia. Patient states she is doing well on medication.      Visit Vitals  /68   Pulse 63   Temp 97.3 °F (36.3 °C)   Resp 14   Ht 5' 3\" (1.6 m)   Wt 120 lb (54.4 kg)   SpO2 99%   BMI 21.26 kg/m²

## 2023-04-12 DIAGNOSIS — G50.0 TRIGEMINAL NEURALGIA: ICD-10-CM

## 2023-04-13 RX ORDER — GABAPENTIN 300 MG/1
CAPSULE ORAL
Qty: 630 CAPSULE | Refills: 1 | OUTPATIENT
Start: 2023-04-13

## 2023-04-13 RX ORDER — OXCARBAZEPINE 300 MG/1
300 TABLET, FILM COATED ORAL 4 TIMES DAILY
Qty: 360 TABLET | Refills: 1 | OUTPATIENT
Start: 2023-04-13

## 2023-04-18 DIAGNOSIS — G50.0 TRIGEMINAL NEURALGIA: ICD-10-CM

## 2023-04-18 RX ORDER — GABAPENTIN 300 MG/1
CAPSULE ORAL
Qty: 630 CAPSULE | Refills: 1 | Status: SHIPPED | OUTPATIENT
Start: 2023-04-18

## 2023-04-19 DIAGNOSIS — G50.0 TRIGEMINAL NEURALGIA: ICD-10-CM

## 2023-04-19 RX ORDER — OXCARBAZEPINE 300 MG/1
300 TABLET, FILM COATED ORAL 4 TIMES DAILY
Qty: 360 TABLET | Refills: 1 | Status: SHIPPED | OUTPATIENT
Start: 2023-04-19

## 2023-05-24 RX ORDER — ESCITALOPRAM OXALATE 10 MG/1
10 TABLET ORAL DAILY
COMMUNITY
Start: 2021-11-16

## 2023-05-24 RX ORDER — OXCARBAZEPINE 300 MG/1
300 TABLET, FILM COATED ORAL 4 TIMES DAILY
COMMUNITY
Start: 2023-04-19

## 2023-05-24 RX ORDER — GABAPENTIN 300 MG/1
CAPSULE ORAL
COMMUNITY
Start: 2023-04-18

## 2023-09-06 ENCOUNTER — OFFICE VISIT (OUTPATIENT)
Age: 58
End: 2023-09-06
Payer: COMMERCIAL

## 2023-09-06 VITALS — OXYGEN SATURATION: 96 % | HEART RATE: 64 BPM | SYSTOLIC BLOOD PRESSURE: 122 MMHG | DIASTOLIC BLOOD PRESSURE: 74 MMHG

## 2023-09-06 DIAGNOSIS — G50.0 TRIGEMINAL NEURALGIA: Primary | ICD-10-CM

## 2023-09-06 PROCEDURE — 99214 OFFICE O/P EST MOD 30 MIN: CPT | Performed by: NURSE PRACTITIONER

## 2023-09-06 RX ORDER — GABAPENTIN 300 MG/1
CAPSULE ORAL
Qty: 630 CAPSULE | Refills: 1 | Status: SHIPPED | OUTPATIENT
Start: 2023-09-06 | End: 2023-10-05

## 2023-09-06 RX ORDER — OXCARBAZEPINE 300 MG/1
300 TABLET, FILM COATED ORAL 4 TIMES DAILY
Qty: 360 TABLET | Refills: 1 | Status: SHIPPED | OUTPATIENT
Start: 2023-09-06

## 2023-09-06 NOTE — PROGRESS NOTES
Things have been stable   No issues with any meds
AGRATIO, GLOB    No results found for: CHOL  No results found for: TRIG  No results found for: HDL  No results found for: LDLCHOLESTEROL, LDLCALC  No results found for: LABVLDL, VLDL  No results found for: CHOLHDLRATIO    No results found for: SEDRATE    No results found for: LABA1C  No results found for: JOSE             1. Trigeminal neuralgia  -     gabapentin (NEURONTIN) 300 MG capsule; 7 po per day  Indications: neuropathic pain  Indications: neuropathic pain, Disp-630 capsule, R-1Normal     Continue Gabapentin and Trileptal for TGN left sided management   She has been doing really well on her current doses and will not change. Call if anything does change.                This note will not be viewable in [facility]

## 2024-03-06 ENCOUNTER — OFFICE VISIT (OUTPATIENT)
Age: 59
End: 2024-03-06
Payer: COMMERCIAL

## 2024-03-06 VITALS — SYSTOLIC BLOOD PRESSURE: 114 MMHG | DIASTOLIC BLOOD PRESSURE: 70 MMHG

## 2024-03-06 DIAGNOSIS — G50.0 TRIGEMINAL NEURALGIA: ICD-10-CM

## 2024-03-06 PROCEDURE — 99214 OFFICE O/P EST MOD 30 MIN: CPT | Performed by: NURSE PRACTITIONER

## 2024-03-06 RX ORDER — OXCARBAZEPINE 300 MG/1
300 TABLET, FILM COATED ORAL 4 TIMES DAILY
Qty: 360 TABLET | Refills: 1 | Status: SHIPPED | OUTPATIENT
Start: 2024-03-06

## 2024-03-06 RX ORDER — GABAPENTIN 300 MG/1
CAPSULE ORAL
Qty: 630 CAPSULE | Refills: 1 | Status: SHIPPED | OUTPATIENT
Start: 2024-03-06 | End: 2024-09-04

## 2024-03-06 NOTE — PROGRESS NOTES
Said the neuralgia hasn't bothered at all    
    Socioeconomic History    Marital status:      Spouse name: None    Number of children: None    Years of education: None    Highest education level: None   Tobacco Use    Smoking status: Former     Current packs/day: 0.00     Types: Cigarettes     Start date: 1978     Quit date: 1998     Years since quittin.1    Smokeless tobacco: Never   Substance and Sexual Activity    Alcohol use: Yes     Alcohol/week: 3.3 standard drinks of alcohol    Drug use: Never       Review of Systems      Remainder of comprehensive review is negative.     Physical Exam :    /70 (Site: Left Upper Arm, Position: Sitting, Cuff Size: Medium Adult)     General: Well defined, nourished, and groomed individual in no acute distress.    Neck: Supple, nontender, no bruits, no pain with resistance to active range of motion.    Heart: Regular rate and rhythm, no murmurs, rub, or gallop. Normal S1S2.  Lungs: Clear to auscultation bilaterally with equal chest expansion, no cough, no wheeze  Musculoskeletal: Extremities revealed no edema and had full range of motion of joints.    Psych: Good mood and bright affect    NEUROLOGICAL EXAMINATION:    Mental Status: Alert and oriented to person, place, and time    Cranial Nerves:    II, III, IV, VI: Visual acuity grossly intact. Visual fields are normal.    Pupils are equal, round, and reactive to light and accommodation.    Extra-ocular movements are full and fluid. Fundoscopic exam was benign, no ptosis or nystagmus.    V-XII: Hearing is grossly intact. Facial features are symmetric, with normal sensation and strength. The palate rises symmetrically and the tongue protrudes midline. Sternocleidomastoids 5/5.     Motor Examination: Normal tone, bulk, and strength, 5/5 muscle strength throughout.     Coordination: Finger to nose was normal. No resting or intention tremor    Gait and Station: Steady while walking. Normal arm swing. No pronator drift. No muscle wasting or

## 2024-09-16 DIAGNOSIS — G50.0 TRIGEMINAL NEURALGIA: ICD-10-CM

## 2024-09-17 RX ORDER — GABAPENTIN 300 MG/1
CAPSULE ORAL
Qty: 630 CAPSULE | Refills: 1 | Status: SHIPPED | OUTPATIENT
Start: 2024-09-17 | End: 2025-03-18

## 2025-01-26 ENCOUNTER — HOSPITAL ENCOUNTER (EMERGENCY)
Facility: HOSPITAL | Age: 60
Discharge: HOME | End: 2025-01-26
Attending: EMERGENCY MEDICINE
Payer: COMMERCIAL

## 2025-01-26 VITALS
DIASTOLIC BLOOD PRESSURE: 61 MMHG | OXYGEN SATURATION: 100 % | SYSTOLIC BLOOD PRESSURE: 124 MMHG | HEIGHT: 63 IN | HEART RATE: 64 BPM | BODY MASS INDEX: 21.09 KG/M2 | WEIGHT: 119 LBS | TEMPERATURE: 97.7 F | RESPIRATION RATE: 20 BRPM

## 2025-01-26 DIAGNOSIS — E87.1 CHRONIC HYPONATREMIA: Primary | ICD-10-CM

## 2025-01-26 LAB
ALBUMIN SERPL-MCNC: 3.9 G/DL (ref 3.5–5)
ALBUMIN/GLOB SERPL: 1.5 (ref 1.1–2.2)
ALP SERPL-CCNC: 105 U/L (ref 45–117)
ALT SERPL-CCNC: 14 U/L (ref 12–78)
ANION GAP SERPL CALC-SCNC: 4 MMOL/L (ref 2–12)
AST SERPL-CCNC: 16 U/L (ref 15–37)
BASOPHILS # BLD: 0.04 K/UL (ref 0–0.1)
BASOPHILS NFR BLD: 1.2 % (ref 0–1)
BILIRUB SERPL-MCNC: 0.5 MG/DL (ref 0.2–1)
BUN SERPL-MCNC: 5 MG/DL (ref 6–20)
BUN/CREAT SERPL: 10 (ref 12–20)
CALCIUM SERPL-MCNC: 8.8 MG/DL (ref 8.5–10.1)
CHLORIDE SERPL-SCNC: 93 MMOL/L (ref 97–108)
CO2 SERPL-SCNC: 29 MMOL/L (ref 21–32)
CORTIS SERPL-MCNC: 9.4 UG/DL
CREAT SERPL-MCNC: 0.49 MG/DL (ref 0.55–1.02)
DIFFERENTIAL METHOD BLD: ABNORMAL
EOSINOPHIL # BLD: 0.13 K/UL (ref 0–0.4)
EOSINOPHIL NFR BLD: 3.8 % (ref 0–7)
ERYTHROCYTE [DISTWIDTH] IN BLOOD BY AUTOMATED COUNT: 12.3 % (ref 11.5–14.5)
GLOBULIN SER CALC-MCNC: 2.6 G/DL (ref 2–4)
GLUCOSE SERPL-MCNC: 77 MG/DL (ref 65–100)
HCT VFR BLD AUTO: 36.2 % (ref 35–47)
HGB BLD-MCNC: 12.2 G/DL (ref 11.5–16)
IMM GRANULOCYTES # BLD AUTO: 0.01 K/UL (ref 0–0.04)
IMM GRANULOCYTES NFR BLD AUTO: 0.3 % (ref 0–0.5)
LYMPHOCYTES # BLD: 0.83 K/UL (ref 0.8–3.5)
LYMPHOCYTES NFR BLD: 24.6 % (ref 12–49)
MCH RBC QN AUTO: 30 PG (ref 26–34)
MCHC RBC AUTO-ENTMCNC: 33.7 G/DL (ref 30–36.5)
MCV RBC AUTO: 88.9 FL (ref 80–99)
MONOCYTES # BLD: 0.54 K/UL (ref 0–1)
MONOCYTES NFR BLD: 16 % (ref 5–13)
NEUTS SEG # BLD: 1.83 K/UL (ref 1.8–8)
NEUTS SEG NFR BLD: 54.1 % (ref 32–75)
NRBC # BLD: 0 K/UL (ref 0–0.01)
NRBC BLD-RTO: 0 PER 100 WBC
OSMOLALITY SERPL: 260 MOSM/KG H2O
OSMOLALITY UR: 412 MOSM/KG H2O
PLATELET # BLD AUTO: 231 K/UL (ref 150–400)
PMV BLD AUTO: 9.1 FL (ref 8.9–12.9)
POTASSIUM SERPL-SCNC: 4.2 MMOL/L (ref 3.5–5.1)
PROT SERPL-MCNC: 6.5 G/DL (ref 6.4–8.2)
RBC # BLD AUTO: 4.07 M/UL (ref 3.8–5.2)
SODIUM SERPL-SCNC: 126 MMOL/L (ref 136–145)
SODIUM UR-SCNC: 57 MMOL/L
SPECIMEN HOLD: NORMAL
WBC # BLD AUTO: 3.4 K/UL (ref 3.6–11)

## 2025-01-26 PROCEDURE — 36415 COLL VENOUS BLD VENIPUNCTURE: CPT

## 2025-01-26 PROCEDURE — 82533 TOTAL CORTISOL: CPT

## 2025-01-26 PROCEDURE — 99284 EMERGENCY DEPT VISIT MOD MDM: CPT

## 2025-01-26 PROCEDURE — 80053 COMPREHEN METABOLIC PANEL: CPT

## 2025-01-26 PROCEDURE — 94761 N-INVAS EAR/PLS OXIMETRY MLT: CPT

## 2025-01-26 PROCEDURE — 83930 ASSAY OF BLOOD OSMOLALITY: CPT

## 2025-01-26 PROCEDURE — 85025 COMPLETE CBC W/AUTO DIFF WBC: CPT

## 2025-01-26 PROCEDURE — 84300 ASSAY OF URINE SODIUM: CPT

## 2025-01-26 PROCEDURE — 83935 ASSAY OF URINE OSMOLALITY: CPT

## 2025-01-26 ASSESSMENT — PAIN - FUNCTIONAL ASSESSMENT: PAIN_FUNCTIONAL_ASSESSMENT: NONE - DENIES PAIN

## 2025-01-26 ASSESSMENT — LIFESTYLE VARIABLES
HOW OFTEN DO YOU HAVE A DRINK CONTAINING ALCOHOL: PATIENT DECLINED
HOW MANY STANDARD DRINKS CONTAINING ALCOHOL DO YOU HAVE ON A TYPICAL DAY: PATIENT DECLINED

## 2025-01-26 NOTE — ED NOTES
This nurse spoke with the Charge Nurse and asked to place the patient in a monitored room due to noting patients sodium level being 126.    This nurse placed patient into a room and applied cardiac leads, pulse ox, and BP cuff to patient.    Collected remaining lab work that was newly placed.    Patient in no distress and VSS. Patient denies pain and discomfort.

## 2025-01-26 NOTE — DISCHARGE INSTRUCTIONS
You were seen in the emergency department today for evaluation of low sodium.  You have had low sodium since at least the fall 2024.  From our records, last time he had blood work with us in 2022, your sodium was normal.  Based on your history your low sodium is likely CHRONIC, and you are showing no no concerning symptoms for acute or severe hyponatremia. We have a low suspicion for hypovolemic hyponatremia, non-hypotonic hyponatremia, or hypervolemic hyponatremia. We sent off additional lab work (serum and urine osmolality, cortisol level and urine sodium) that your primary care doctor can follow up with. We suspect that your most likely cause for low sodium is decreased intake, and we recommend to increase your sodium intake in your diet and to keep a food log at home so your primary care doctor can review it with you. Please follow up with your PCP in 1 week so they can review the lab work we sent, check your food log, and repeat your blood work. If you develop any worsening symptoms such as nausea, vomiting, muscle cramps, headache, seizures, or shortness of breath, please return to the ER to be reevaluated.

## 2025-01-26 NOTE — ED PROVIDER NOTES
[facility] EMERGENCY DEPARTMENT  EMERGENCY DEPARTMENT ENCOUNTER      Pt Name: Janay Giraldo  MRN: [id number]  Birthdate 1965  Date of evaluation: 2025  Provider: Sammi Torres DO    CHIEF COMPLAINT       Chief Complaint   Patient presents with    Abnormal Lab         HISTORY OF PRESENT ILLNESS   (Location/Symptom, Timing/Onset, Context/Setting, Quality, Duration, Modifying Factors, Severity)  Note limiting factors.   HPI    Patient is a 59-year-old female who presents to the emergency department with hyponatremia.    Review of External Medical Records:     Nursing Notes were reviewed.    REVIEW OF SYSTEMS    (2-9 systems for level 4, 10 or more for level 5)     Review of Systems    Except as noted above the remainder of the review of systems was reviewed and negative.       PAST MEDICAL HISTORY     Past Medical History:   Diagnosis Date    Decreased libido 2013    Low testosterone    Postmenopausal atrophic vaginitis 2015    Trigeminal neuralgia 2012    Vitamin B 12 deficiency     Vitamin D deficiency 2013    21    Yeast vaginitis          SURGICAL HISTORY       Past Surgical History:   Procedure Laterality Date    BREAST BIOPSY Left     Negative     SECTION  10/1992         CURRENT MEDICATIONS       Previous Medications    ESCITALOPRAM (LEXAPRO) 10 MG TABLET    Take 1 tablet by mouth daily    GABAPENTIN (NEURONTIN) 300 MG CAPSULE    7 po per day  Indications: neuropathic pain  Indications: neuropathic pain    OXCARBAZEPINE (TRILEPTAL) 300 MG TABLET    Take 1 tablet by mouth 4 times daily       ALLERGIES     Penicillins    FAMILY HISTORY       Family History   Problem Relation Age of Onset    Stroke Mother     Breast Cancer Sister 42        pt declines brca testing          SOCIAL HISTORY       Social History     Socioeconomic History    Marital status:    Tobacco Use    Smoking status: Former     Current packs/day: 0.00     Types:

## 2025-01-26 NOTE — ED TRIAGE NOTES
Patient ambulatory to ER triage for complaints of low sodium.     Patient reports low sodium found in Oct 2024 during physical appointment, followed up with [facility] yesterday, states sodium is 126.     Denies any symptoms of low sodium at this time.     Denies chest pain and SOB.

## 2025-04-01 DIAGNOSIS — G50.0 TRIGEMINAL NEURALGIA: ICD-10-CM

## 2025-04-01 RX ORDER — OXCARBAZEPINE 300 MG/1
300 TABLET, FILM COATED ORAL 4 TIMES DAILY
Qty: 360 TABLET | Refills: 1 | Status: SHIPPED | OUTPATIENT
Start: 2025-04-01

## 2025-04-09 ENCOUNTER — OFFICE VISIT (OUTPATIENT)
Age: 60
End: 2025-04-09
Payer: COMMERCIAL

## 2025-04-09 VITALS
RESPIRATION RATE: 18 BRPM | SYSTOLIC BLOOD PRESSURE: 120 MMHG | TEMPERATURE: 97.9 F | HEART RATE: 72 BPM | OXYGEN SATURATION: 100 % | DIASTOLIC BLOOD PRESSURE: 70 MMHG

## 2025-04-09 DIAGNOSIS — G50.0 TRIGEMINAL NEURALGIA: ICD-10-CM

## 2025-04-09 PROCEDURE — 99214 OFFICE O/P EST MOD 30 MIN: CPT | Performed by: NURSE PRACTITIONER

## 2025-04-09 RX ORDER — GABAPENTIN 300 MG/1
CAPSULE ORAL
Qty: 630 CAPSULE | Refills: 1 | Status: SHIPPED | OUTPATIENT
Start: 2025-04-09 | End: 2025-10-08

## 2025-04-09 NOTE — PROGRESS NOTES
MCV 88.9 01/26/2025     01/26/2025     Lab Results   Component Value Date     (L) 01/26/2025    K 4.2 01/26/2025    CL 93 (L) 01/26/2025    CO2 29 01/26/2025    BUN 5 (L) 01/26/2025    CREATININE 0.49 (L) 01/26/2025    GLUCOSE 77 01/26/2025    CALCIUM 8.8 01/26/2025    BILITOT 0.5 01/26/2025    ALKPHOS 105 01/26/2025    AST 16 01/26/2025    ALT 14 01/26/2025    LABGLOM >90 01/26/2025    GLOB 2.6 01/26/2025       No results found for: \"CHOL\"  No results found for: \"TRIG\"  No results found for: \"HDL\"  No components found for: \"LDLCHOLESTEROL\", \"LDLCALC\"  No results found for: \"VLDL\"  No results found for: \"CHOLHDLRATIO\"    No results found for: \"SEDRATE\"    No results found for: \"LABA1C\"  No results found for: \"JOSE\"             1. Trigeminal neuralgia  -     gabapentin (NEURONTIN) 300 MG capsule; 7 po per day  Indications: neuropathic pain  Indications: neuropathic pain, Disp-630 capsule, R-1Normal     Continue Gabapentin 7 capsules daily.    Trileptal 300 mg tablets four times daily.   We discussed dropping for sodium.   She will increase intake.   Labs are next few weeks, monitor.         This note was created using voice recognition software. Despite editing, there may be syntax errors.

## 2025-05-09 ENCOUNTER — TELEPHONE (OUTPATIENT)
Age: 60
End: 2025-05-09

## 2025-05-09 DIAGNOSIS — E87.1 HYPONATREMIA: Primary | ICD-10-CM

## 2025-05-09 NOTE — TELEPHONE ENCOUNTER
PLEASE CALL HER    AND LET HER KNOW that her Sodium was again too low.     At 123.     We will want to drop 2 of the Trileptal tablets.     Currently she is on 300 mg (1 tablet) four times daily.     Have her drop the dosing to TWICE DAILY     And I will put in another sodium level to be drawn 1 month after she does this.     Increase salt intake and get the lab done 1 month after dropping down     If the TGN increases, call us back and we will alter medications.   Thanks

## 2025-05-27 ENCOUNTER — TELEPHONE (OUTPATIENT)
Age: 60
End: 2025-05-27